# Patient Record
Sex: FEMALE | Race: WHITE | Employment: FULL TIME | URBAN - METROPOLITAN AREA
[De-identification: names, ages, dates, MRNs, and addresses within clinical notes are randomized per-mention and may not be internally consistent; named-entity substitution may affect disease eponyms.]

---

## 2019-03-19 ENCOUNTER — PRENATAL OFFICE VISIT (OUTPATIENT)
Dept: NURSING | Facility: CLINIC | Age: 39
End: 2019-03-19
Payer: COMMERCIAL

## 2019-03-19 DIAGNOSIS — O09.529 SUPERVISION OF HIGH-RISK PREGNANCY OF ELDERLY MULTIGRAVIDA: ICD-10-CM

## 2019-03-19 DIAGNOSIS — Z23 NEED FOR PROPHYLACTIC VACCINATION AND INOCULATION AGAINST INFLUENZA: Primary | ICD-10-CM

## 2019-03-19 LAB
ABO + RH BLD: NORMAL
ABO + RH BLD: NORMAL
BLD GP AB SCN SERPL QL: NORMAL
BLOOD BANK CMNT PATIENT-IMP: NORMAL
ERYTHROCYTE [DISTWIDTH] IN BLOOD BY AUTOMATED COUNT: 12.6 % (ref 10–15)
HCT VFR BLD AUTO: 37.4 % (ref 35–47)
HGB BLD-MCNC: 13.2 G/DL (ref 11.7–15.7)
MCH RBC QN AUTO: 30.1 PG (ref 26.5–33)
MCHC RBC AUTO-ENTMCNC: 35.3 G/DL (ref 31.5–36.5)
MCV RBC AUTO: 85 FL (ref 78–100)
PLATELET # BLD AUTO: 178 10E9/L (ref 150–450)
RBC # BLD AUTO: 4.38 10E12/L (ref 3.8–5.2)
SPECIMEN EXP DATE BLD: NORMAL
WBC # BLD AUTO: 9.3 10E9/L (ref 4–11)

## 2019-03-19 PROCEDURE — 87389 HIV-1 AG W/HIV-1&-2 AB AG IA: CPT | Performed by: ADVANCED PRACTICE MIDWIFE

## 2019-03-19 PROCEDURE — 87591 N.GONORRHOEAE DNA AMP PROB: CPT | Performed by: ADVANCED PRACTICE MIDWIFE

## 2019-03-19 PROCEDURE — 87491 CHLMYD TRACH DNA AMP PROBE: CPT | Performed by: ADVANCED PRACTICE MIDWIFE

## 2019-03-19 PROCEDURE — 36415 COLL VENOUS BLD VENIPUNCTURE: CPT | Performed by: ADVANCED PRACTICE MIDWIFE

## 2019-03-19 PROCEDURE — 86762 RUBELLA ANTIBODY: CPT | Performed by: ADVANCED PRACTICE MIDWIFE

## 2019-03-19 PROCEDURE — 99207 ZZC NO CHARGE NURSE ONLY: CPT

## 2019-03-19 PROCEDURE — 0064U ANTB TP TOTAL&RPR IA QUAL: CPT | Performed by: ADVANCED PRACTICE MIDWIFE

## 2019-03-19 PROCEDURE — 86900 BLOOD TYPING SEROLOGIC ABO: CPT | Performed by: ADVANCED PRACTICE MIDWIFE

## 2019-03-19 PROCEDURE — 85027 COMPLETE CBC AUTOMATED: CPT | Performed by: ADVANCED PRACTICE MIDWIFE

## 2019-03-19 PROCEDURE — 82306 VITAMIN D 25 HYDROXY: CPT | Performed by: ADVANCED PRACTICE MIDWIFE

## 2019-03-19 PROCEDURE — 87340 HEPATITIS B SURFACE AG IA: CPT | Performed by: ADVANCED PRACTICE MIDWIFE

## 2019-03-19 PROCEDURE — 86901 BLOOD TYPING SEROLOGIC RH(D): CPT | Performed by: ADVANCED PRACTICE MIDWIFE

## 2019-03-19 PROCEDURE — 87086 URINE CULTURE/COLONY COUNT: CPT | Performed by: ADVANCED PRACTICE MIDWIFE

## 2019-03-19 PROCEDURE — 86850 RBC ANTIBODY SCREEN: CPT | Performed by: ADVANCED PRACTICE MIDWIFE

## 2019-03-19 ASSESSMENT — MIFFLIN-ST. JEOR: SCORE: 1267.15

## 2019-03-19 NOTE — PROGRESS NOTES
"Chief Complaint   Patient presents with     Prenatal Care     New Prenatal Nurse Visit   8w6d    Estimated Date of Delivery: Oct 23, 2019      Initial BP (!) (P) 86/44 (BP Location: Right arm, Cuff Size: Adult Regular)   Ht 1.689 m (5' 6.5\")   Wt 56.2 kg (124 lb)   LMP 2019 (Exact Date)   BMI 19.71 kg/m   Estimated body mass index is 19.71 kg/m  as calculated from the following:    Height as of this encounter: 1.689 m (5' 6.5\").    Weight as of this encounter: 56.2 kg (124 lb).  BP completed using cuff size: regular    Questioned patient about current smoking habits.  Pt. has never smoked.    Prenatal book and folder (containing standard educational hand-outs and brochures) given to patient. Information in folder reviewed. Questions answered.     Discussed and gave written information on options available for 1st and 2nd trimester screening, CVS, amniocentesis, AFP, and QUAD screen plus optimal time-frame for testing. Brochure given on optional screening available to determine Cystic Fibrosis carrier status. Pt instructed to check with insurance regarding coverage of these optional tests. Pt advised to call back if she desires testing or has any questions or concerns.    Patient was in [place] from 19-2/15/19.  States was tested for the Zyka virus and it was negative.   has not been tested.  They have been having protected intercourse since + pregnancy test.    Prenatal labs obtained. New prenatal visit scheduled on 3/25/19 with Alley Madrid.  Meli Dejesus RN               "

## 2019-03-20 VITALS — BODY MASS INDEX: 19.46 KG/M2 | HEIGHT: 67 IN | WEIGHT: 124 LBS | HEART RATE: 76 BPM

## 2019-03-20 LAB
BACTERIA SPEC CULT: NORMAL
C TRACH DNA SPEC QL NAA+PROBE: NEGATIVE
DEPRECATED CALCIDIOL+CALCIFEROL SERPL-MC: 36 UG/L (ref 20–75)
HBV SURFACE AG SERPL QL IA: NONREACTIVE
HIV 1+2 AB+HIV1 P24 AG SERPL QL IA: NONREACTIVE
N GONORRHOEA DNA SPEC QL NAA+PROBE: NEGATIVE
RUBV IGG SERPL IA-ACNC: 121 IU/ML
SPECIMEN SOURCE: NORMAL
T PALLIDUM AB SER QL: NONREACTIVE

## 2019-03-22 DIAGNOSIS — O09.529 SUPERVISION OF HIGH-RISK PREGNANCY OF ELDERLY MULTIGRAVIDA: ICD-10-CM

## 2019-03-25 ENCOUNTER — PRENATAL OFFICE VISIT (OUTPATIENT)
Dept: OBGYN | Facility: CLINIC | Age: 39
End: 2019-03-25
Payer: COMMERCIAL

## 2019-03-25 VITALS — SYSTOLIC BLOOD PRESSURE: 86 MMHG | BODY MASS INDEX: 20.03 KG/M2 | DIASTOLIC BLOOD PRESSURE: 50 MMHG | WEIGHT: 126 LBS

## 2019-03-25 DIAGNOSIS — Z34.80 SUPERVISION OF OTHER NORMAL PREGNANCY, ANTEPARTUM: ICD-10-CM

## 2019-03-25 PROBLEM — Z87.59 HISTORY OF PLACENTA PREVIA: Status: ACTIVE | Noted: 2019-03-25

## 2019-03-25 PROBLEM — D03.52: Status: ACTIVE | Noted: 2018-01-16

## 2019-03-25 PROBLEM — N80.9 ENDOMETRIOSIS: Status: ACTIVE | Noted: 2017-07-25

## 2019-03-25 PROCEDURE — 99207 ZZC FIRST OB VISIT: CPT | Performed by: ADVANCED PRACTICE MIDWIFE

## 2019-03-25 NOTE — NURSING NOTE
"Chief Complaint   Patient presents with     Prenatal Care     9 weeks 5 days- placenta previa in previous pregnancy       Initial BP (!) 86/50 (BP Location: Right arm, Patient Position: Sitting, Cuff Size: Adult Regular)   Wt 57.2 kg (126 lb)   LMP 2019 (Exact Date)   BMI 20.03 kg/m   Estimated body mass index is 20.03 kg/m  as calculated from the following:    Height as of 3/19/19: 1.689 m (5' 6.5\").    Weight as of this encounter: 57.2 kg (126 lb).  BP completed using cuff size: regular    Questioned patient about current smoking habits.  Pt. has never smoked.          The following HM Due: NONE    9w5d  Hemal Crews CMA              "

## 2019-03-25 NOTE — PATIENT INSTRUCTIONS
When to call   Discuss this with your nurse-midwife.  In general, you should call if you have leaking fluid, bleeding, decreased baby movement or if your contractions are about five minutes apart.  The midwife pager phone number is [phone]. Please call this number.  When you call, you will be prompted to enter the call back number you would like the on call provider to call back.  Please enter a 10 digit phone number. You will hear a few beeps after entering the phone number.  The on-call provider will call you back, perhaps from a blocked phone number. If you do not hear from the on call midwife within 20 minutes, please call our main clinic phone [phone].    The hospital     Your baby will be born at the Birth[facility], which is on the fourth floor of [facility].  Call ([phone]) to arrange a tour of the Birth[facility].     Packing your bag    Bring clothing for you and your baby, and anything that may make your birth/stay more comfortable.  It is a good idea to practice installing the car seat before you deliver.  Please do not bring valuables to the hospital.

## 2019-03-25 NOTE — PROGRESS NOTES
Philly Turner is a 38 year old  ,  who is not a previous CNM patient. She presents for a new OB Visit. This was a planned pregnancy.     FOB is Joce who is in good health.  FOB IS actively involved in relationship and this pregnancy. Patient and  had been trying for 1 year and did not conceive. Went on a trip to [facility] in Feb and then found out she was pregnant. Concerned for Zika virus, has questions about  getting tested and intercourse. Patient states she was tested at her previous office and was negative.     She has not had bleeding since her LMP.    She denies abdominal pain since her LMP.  She has had nausea.  has had vomiting.  Any personal or family history of blood clots? No  History of sickle cell anemia or trait? No         Patient's last menstrual period was 2019 (exact date)..  Estimated Date of Delivery: Data Unavailable Ultrasound consistent with LMP.    MENSTRUAL HISTORY    frequency: every 28-30 days  Last PAP:  2017  History of abnormal Pap?  No    Health maintenance updated:  yes        Current medications are:    Current Outpatient Medications:      Prenatal Vit-Fe Fumarate-FA (PRENATAL MULTIVITAMIN  PLUS IRON) 27-0.8 MG TABS, Take 1 tablet by mouth daily., Disp: , Rfl:      INFECTION HISTORY  HIV: No  Hepatitis B: No  Hepatitis C: No  Tuberculosis: No    Genital Herpes self: no  Herpes partner:  no  Chlamydia:  no  Gonorrhea:  no  HPV: No  BV:  No  Syphilis:  No  Chicken Pox:  Yes - had       OB HISTORY  Obstetric History       T2      L2     SAB0   TAB0   Ectopic0   Multiple0   Live Births2       # Outcome Date GA Lbr Uziel/2nd Weight Sex Delivery Anes PTL Lv   3 Current            2 Term 14 39w3d / 00:15 3.37 kg (7 lb 6.9 oz) F Vag-Spont Local N MARTHA      Apgar1:  8                Apgar5: 9   1 Term 12 39w1d 08:06 / 00:39 3.459 kg (7 lb 10 oz) M Vag-Spont Local N MARTHA      Name: GARTH TURNER      Apgar1:  8                 Apgar5: 9          History of GDM: No,  PTL : No,  History of HTN in pregnancy: No,  Thrombocytopenia: No,  Shoulder dystocia: No,  Vacuum Extraction: No  PPH: No   3rd of 4th degree laceration: No.   Other complications: No    PERSONAL HISTORY  Exercise Habits:  jogging 4-7 days per week.  Employment: Full time.  Her job involves light activity with little potential for toxic exposure.    Travel plans:  are intra [place] air travel.   Diet: follows a balanced nutrition diet. No gluten or lactose   Abuse concerns? No  Hgb A1c screen:  BMI > 30: No, 1st degree family DM: No, History of GDM: No, PCOS: No, High risk ethnicity: No    Social History     Socioeconomic History     Marital status:      Spouse name: Not on file     Number of children: 2     Years of education: Not on file     Highest education level: Bachelor's degree (e.g., BA, AB, BS)   Occupational History     Not on file   Social Needs     Financial resource strain: Not on file     Food insecurity:     Worry: Not on file     Inability: Not on file     Transportation needs:     Medical: Not on file     Non-medical: Not on file   Tobacco Use     Smoking status: Never Smoker     Smokeless tobacco: Never Used   Substance and Sexual Activity     Alcohol use: No     Drug use: No     Sexual activity: Yes     Partners: Male     Birth control/protection: None   Lifestyle     Physical activity:     Days per week: Not on file     Minutes per session: Not on file     Stress: Not on file   Relationships     Social connections:     Talks on phone: Not on file     Gets together: Not on file     Attends Rastafarian service: Not on file     Active member of club or organization: Not on file     Attends meetings of clubs or organizations: Not on file     Relationship status: Not on file     Intimate partner violence:     Fear of current or ex partner: Not on file     Emotionally abused: Not on file     Physically abused: Not on file     Forced sexual activity: Not on  file   Other Topics Concern     Not on file   Social History Narrative     Not on file         She  reports that  has never smoked. she has never used smokeless tobacco.    STD testing offered?  Accepted  Last PHQ-9 score on record = No flowsheet data found.  Last GAD7 score on record = No flowsheet data found.  Alcohol Score = Not since pos UPT   Referral/Meds needed? Yes, M for 20 wk US     PAST MEDICAL/SURGICAL HISTORY  Past Medical History:   Diagnosis Date     Heart disease      incorrect dx ofLong QT wave, defribrillator placed. Dx reversed in  no longer has defibrillator, but wires still in place     Past Surgical History:   Procedure Laterality Date     CL AFF SURGICAL PATHOLOGY      melanoma chest stage 0     defribrillator[       LAPAROSCOPY DIAGNOSTIC (GYN)      endometriosis       FAMILY HISTORY  Family History   Problem Relation Age of Onset     Family History Negative Mother      Family History Negative Father      ALS Maternal Grandmother      Leukemia Paternal Grandfather      ROS:  12 point review of systems negative other than symptoms noted below.    PHYSICAL EXAM  Vitals: LMP 2019 (Exact Date)   BMI= There is no height or weight on file to calculate BMI.     GENERAL:  38 year old pleasant pregnant female, alert, cooperative and well groomed.  NECK:  Thyroid without enlargement and nodules.  Lymph nodes not palpable.   LUNGS:  Clear to auscultation.  BREAST:  Deferred   HEART:  RRR without murmur.  ABDOMEN: Soft without masses or tenderness.  No scars noted..  GENITALIA: deferred     LOWER EXTREMITIES: No edema. No significant varicosities.    ASSESSMENT/PLAN:    consult for US for AMA patients: NA AMA as recommended by M >age 40   Genetic Testing reviewed and discussed, patient desires to decline. Handout provided      COUNSELING    Instructed on use of triage nurse line and contacting the on call CNM after hours in an emergency.     Symptoms of N&V and fatigue usually  start to resolve around 12-16 weeks     Reviewed CNM philosophy, call schedule for labor and delivery, and [facility] for delivery    1st OB handout given outlining appointment spacing and CNM information    Reviewed exercise and nutrition    Recommend to gain 20 pounds with her pregnancy.    Discussed OTC medications. OB med list given    Encouraged patient to arrange  if needed    Encouraged patient to take PNV's/DHA    Travel precautions discussed, no air travel after 36 weeks and Zika Virus discussed    Will call patient with lab results when available    F/U to be addressed next visit:  return to clinic 4 weeks, referrals discussed MFM for 20 wk US given possible Zika exposure. Reviewed CDC recommendation for using condom or abstaining during entire pregnancy.  desires to get tested  For Zika.     Will return to the clinic in 4 weeks for her next routine prenatal check.  Will call to be seen sooner if problems arise.        Alley Madrid, DNP, APRN, CNM

## 2019-04-23 ENCOUNTER — PRENATAL OFFICE VISIT (OUTPATIENT)
Dept: OBGYN | Facility: CLINIC | Age: 39
End: 2019-04-23
Payer: COMMERCIAL

## 2019-04-23 VITALS
HEIGHT: 67 IN | BODY MASS INDEX: 20.58 KG/M2 | SYSTOLIC BLOOD PRESSURE: 98 MMHG | WEIGHT: 131.1 LBS | DIASTOLIC BLOOD PRESSURE: 54 MMHG

## 2019-04-23 DIAGNOSIS — Z34.80 SUPERVISION OF OTHER NORMAL PREGNANCY, ANTEPARTUM: Primary | ICD-10-CM

## 2019-04-23 PROCEDURE — 99207 ZZC PRENATAL VISIT: CPT | Performed by: ADVANCED PRACTICE MIDWIFE

## 2019-04-23 ASSESSMENT — MIFFLIN-ST. JEOR: SCORE: 1307.3

## 2019-04-23 NOTE — LETTER
re: Philly Busch  [address] [place] [address]    To Whom it May Concern,  Philly Busch is a prenatal patient at our clinic currently 13 weeks 6 days. She has a normal healthy pregnancy and is able to receive massage. Please assure that she does not lay directly on abdomen. Please contact the number listed above if you have any questions. Thank you for your consideration.               Sincerely,        Alley Madrid, PHILLY, APRN, CNM, IBCLC

## 2019-04-23 NOTE — PROGRESS NOTES
"S:Feels well  Fetal movement possibly   Denies loss of fluid/vb/contractions/pelvic pain  Depression screening done  O:  BP 98/54   Ht 1.702 m (5' 7\")   Wt 59.5 kg (131 lb 1.6 oz)   LMP 01/16/2019 (Exact Date)   BMI 20.53 kg/m    Exam:  Constitutional: healthy, alert and no distress  Respiratory: Respirations even and unlabored  Gastrointestinal: Abdomen soft, non-tender. Fundus measures appropriately for gestational age. Fetal heart tones heard easily.  Psychiatric: mentation appears normal and affect normal/bright  A: (Z34.80) Supervision of other normal pregnancy, antepartum  (primary encounter diagnosis)  Comment: wnl  Plan: US OB > 14 Weeks        return to clinic 4 weeks       P: Declines genetic screening   Anatomy ultrasound next visit between 20-22 weeks  Return to clinic 4 weeks    Alley Madrid, DNP, APRN, CNM, IBCLC        "

## 2019-04-23 NOTE — NURSING NOTE
"Chief Complaint   Patient presents with     Prenatal Care     13W6D       Initial BP 98/54   Ht 1.702 m (5' 7\")   Wt 59.5 kg (131 lb 1.6 oz)   LMP 2019 (Exact Date)   BMI 20.53 kg/m   Estimated body mass index is 20.53 kg/m  as calculated from the following:    Height as of this encounter: 1.702 m (5' 7\").    Weight as of this encounter: 59.5 kg (131 lb 1.6 oz).  BP completed using cuff size: regular    Questioned patient about current smoking habits.  Pt. has never smoked.          The following HM Due: NONE    Yasmeen Soto MA           "

## 2019-05-21 ENCOUNTER — PRENATAL OFFICE VISIT (OUTPATIENT)
Dept: OBGYN | Facility: CLINIC | Age: 39
End: 2019-05-21
Payer: COMMERCIAL

## 2019-05-21 VITALS
DIASTOLIC BLOOD PRESSURE: 58 MMHG | HEIGHT: 67 IN | BODY MASS INDEX: 20.92 KG/M2 | WEIGHT: 133.3 LBS | SYSTOLIC BLOOD PRESSURE: 84 MMHG

## 2019-05-21 DIAGNOSIS — I95.9 HYPOTENSION DETERMINED BY EXAMINATION: ICD-10-CM

## 2019-05-21 DIAGNOSIS — Z34.80 SUPERVISION OF OTHER NORMAL PREGNANCY, ANTEPARTUM: Primary | ICD-10-CM

## 2019-05-21 DIAGNOSIS — R00.2 HEART PALPITATIONS: ICD-10-CM

## 2019-05-21 LAB
ERYTHROCYTE [DISTWIDTH] IN BLOOD BY AUTOMATED COUNT: 14.1 % (ref 10–15)
HCT VFR BLD AUTO: 34.7 % (ref 35–47)
HGB BLD-MCNC: 12.3 G/DL (ref 11.7–15.7)
MCH RBC QN AUTO: 31.1 PG (ref 26.5–33)
MCHC RBC AUTO-ENTMCNC: 35.4 G/DL (ref 31.5–36.5)
MCV RBC AUTO: 88 FL (ref 78–100)
PLATELET # BLD AUTO: 173 10E9/L (ref 150–450)
RBC # BLD AUTO: 3.96 10E12/L (ref 3.8–5.2)
WBC # BLD AUTO: 9.8 10E9/L (ref 4–11)

## 2019-05-21 PROCEDURE — 84450 TRANSFERASE (AST) (SGOT): CPT | Performed by: ADVANCED PRACTICE MIDWIFE

## 2019-05-21 PROCEDURE — 99207 ZZC PRENATAL VISIT: CPT | Performed by: ADVANCED PRACTICE MIDWIFE

## 2019-05-21 PROCEDURE — 84460 ALANINE AMINO (ALT) (SGPT): CPT | Performed by: ADVANCED PRACTICE MIDWIFE

## 2019-05-21 PROCEDURE — 85027 COMPLETE CBC AUTOMATED: CPT | Performed by: ADVANCED PRACTICE MIDWIFE

## 2019-05-21 PROCEDURE — 36415 COLL VENOUS BLD VENIPUNCTURE: CPT | Performed by: ADVANCED PRACTICE MIDWIFE

## 2019-05-21 ASSESSMENT — MIFFLIN-ST. JEOR: SCORE: 1309.33

## 2019-05-21 NOTE — PROGRESS NOTES
"S: Feels well, started having intermittent heart palpatations,  Has started feeling fetal movement.  Denies uterine cramping, vaginal bleeding or leaking of fluid. Denies dizziness, vision changes, bruises, food aversions or inadequate intake.   O: Vitals: LMP 01/16/2019 (Exact Date)  BP (!) 84/58   Ht 1.689 m (5' 6.5\")   Wt 60.5 kg (133 lb 4.8 oz)   LMP 01/16/2019 (Exact Date)   BMI 21.19 kg/m      BMI= There is no height or weight on file to calculate BMI.  Exam:  Constitutional: healthy, alert and no distress  Respiratory: respirations even and unlabored  Gastrointestinal: Abdomen soft, non-tender. Fundus measures appropriate for gestational age. Fetal heart tones hear without difficulty and within normal limits  : Deferred  Psychiatric: mentation appears normal and affect normal/bright  A: (Z34.80) Supervision of other normal pregnancy, antepartum  (primary encounter diagnosis)  Comment: WNL  Plan: CBC with platelets, AST, ALT, CARDIOLOGY EVAL         ADULT REFERRAL        -Eval for hypotension with heart palpitations, has had baseline BPs 80s/50s.  -Recheck platelets and hbg today, eval LFTs for underlying etiology (differential diagnosis  ITP, liver disease)     (R00.2) Heart palpitations  Comment: referral   Plan: CARDIOLOGY EVAL ADULT REFERRAL           (I95.9) Hypotension determined by examination  Plan: CARDIOLOGY EVAL ADULT REFERRAL        See notes     P: Discussed 20 week fetal screen. Has scheduled.   Encouraged patient to call with any questions or concerns.  return to clinic 4 weeks       Alley Madrid, DNP, APRN, CNM, IBCLC      "

## 2019-05-21 NOTE — NURSING NOTE
"Chief Complaint   Patient presents with     Prenatal Care     17w6d       Initial BP (!) 84/58   Ht 1.689 m (5' 6.5\")   Wt 60.5 kg (133 lb 4.8 oz)   LMP 2019 (Exact Date)   BMI 21.19 kg/m   Estimated body mass index is 21.19 kg/m  as calculated from the following:    Height as of this encounter: 1.689 m (5' 6.5\").    Weight as of this encounter: 60.5 kg (133 lb 4.8 oz).  BP completed using cuff size: regular    Questioned patient about current smoking habits.  Pt. has never smoked.          The following HM Due: NONE      Yasmeen oSto MA           "

## 2019-05-22 LAB
ALT SERPL W P-5'-P-CCNC: 21 U/L (ref 0–50)
AST SERPL W P-5'-P-CCNC: 18 U/L (ref 0–45)

## 2019-06-07 ENCOUNTER — ANCILLARY PROCEDURE (OUTPATIENT)
Dept: ULTRASOUND IMAGING | Facility: CLINIC | Age: 39
End: 2019-06-07
Attending: ADVANCED PRACTICE MIDWIFE
Payer: COMMERCIAL

## 2019-06-07 DIAGNOSIS — Z34.80 SUPERVISION OF OTHER NORMAL PREGNANCY, ANTEPARTUM: ICD-10-CM

## 2019-06-07 PROCEDURE — 76805 OB US >/= 14 WKS SNGL FETUS: CPT | Performed by: FAMILY MEDICINE

## 2019-06-12 ENCOUNTER — TRANSCRIBE ORDERS (OUTPATIENT)
Dept: MATERNAL FETAL MEDICINE | Facility: CLINIC | Age: 39
End: 2019-06-12

## 2019-06-12 ENCOUNTER — TELEPHONE (OUTPATIENT)
Dept: OBGYN | Facility: CLINIC | Age: 39
End: 2019-06-12

## 2019-06-12 DIAGNOSIS — O26.90 PREGNANCY RELATED CONDITION: Primary | ICD-10-CM

## 2019-06-12 DIAGNOSIS — O44.42 LOW-LYING PLACENTA IN SECOND TRIMESTER: Primary | ICD-10-CM

## 2019-06-12 DIAGNOSIS — Z34.80 SUPERVISION OF OTHER NORMAL PREGNANCY, ANTEPARTUM: ICD-10-CM

## 2019-06-12 NOTE — TELEPHONE ENCOUNTER
Attempted to call pt to review US results. LM to call clinic back to review. Will need MFM referral. Will wait to place until I review US with patient. Alley Madrid, DNP, APRN, CNM, IBCLC

## 2019-06-12 NOTE — TELEPHONE ENCOUNTER
Called patient and reviewed ultrasound. MFM order placed. Alley Madrid, PHILLY, APRN, CNM, IBCLC

## 2019-06-13 ENCOUNTER — AMBULATORY - HEALTHEAST (OUTPATIENT)
Dept: MATERNAL FETAL MEDICINE | Facility: HOSPITAL | Age: 39
End: 2019-06-13

## 2019-06-13 DIAGNOSIS — O26.90 PREGNANCY, ANTEPARTUM, COMPLICATIONS: ICD-10-CM

## 2019-06-14 ENCOUNTER — OFFICE VISIT - HEALTHEAST (OUTPATIENT)
Dept: MATERNAL FETAL MEDICINE | Facility: HOSPITAL | Age: 39
End: 2019-06-14

## 2019-06-14 ENCOUNTER — MEDICAL CORRESPONDENCE (OUTPATIENT)
Dept: HEALTH INFORMATION MANAGEMENT | Facility: CLINIC | Age: 39
End: 2019-06-14

## 2019-06-14 ENCOUNTER — RECORDS - HEALTHEAST (OUTPATIENT)
Dept: ULTRASOUND IMAGING | Facility: HOSPITAL | Age: 39
End: 2019-06-14

## 2019-06-14 ENCOUNTER — RECORDS - HEALTHEAST (OUTPATIENT)
Dept: ADMINISTRATIVE | Facility: OTHER | Age: 39
End: 2019-06-14

## 2019-06-14 ENCOUNTER — COMMUNICATION - HEALTHEAST (OUTPATIENT)
Dept: MATERNAL FETAL MEDICINE | Facility: HOSPITAL | Age: 39
End: 2019-06-14

## 2019-06-14 ENCOUNTER — AMBULATORY - HEALTHEAST (OUTPATIENT)
Dept: MATERNAL FETAL MEDICINE | Facility: HOSPITAL | Age: 39
End: 2019-06-14

## 2019-06-14 ENCOUNTER — TRANSCRIBE ORDERS (OUTPATIENT)
Dept: MATERNAL FETAL MEDICINE | Facility: CLINIC | Age: 39
End: 2019-06-14

## 2019-06-14 DIAGNOSIS — O44.02 PLACENTA PREVIA IN SECOND TRIMESTER: ICD-10-CM

## 2019-06-14 DIAGNOSIS — O26.90 PREGNANCY RELATED CONDITION, ANTEPARTUM: Primary | ICD-10-CM

## 2019-06-14 DIAGNOSIS — O26.90 PREGNANCY RELATED CONDITIONS, UNSPECIFIED, UNSPECIFIED TRIMESTER: ICD-10-CM

## 2019-06-18 ENCOUNTER — PRENATAL OFFICE VISIT (OUTPATIENT)
Dept: OBGYN | Facility: CLINIC | Age: 39
End: 2019-06-18
Payer: COMMERCIAL

## 2019-06-18 VITALS
WEIGHT: 136.1 LBS | BODY MASS INDEX: 21.36 KG/M2 | SYSTOLIC BLOOD PRESSURE: 94 MMHG | HEIGHT: 67 IN | DIASTOLIC BLOOD PRESSURE: 60 MMHG

## 2019-06-18 DIAGNOSIS — O44.42 LOW-LYING PLACENTA IN SECOND TRIMESTER: ICD-10-CM

## 2019-06-18 DIAGNOSIS — Z34.80 SUPERVISION OF OTHER NORMAL PREGNANCY, ANTEPARTUM: Primary | ICD-10-CM

## 2019-06-18 PROCEDURE — 99207 ZZC PRENATAL VISIT: CPT | Performed by: ADVANCED PRACTICE MIDWIFE

## 2019-06-18 ASSESSMENT — MIFFLIN-ST. JEOR: SCORE: 1329.98

## 2019-06-18 NOTE — PROGRESS NOTES
"S: Feels well,  Has started feeling fetal movement.  Denies uterine cramping, vaginal bleeding or leaking of fluid.  States heart palpitations have resolved. Still seeing cardiology tomorrow.   O: Vitals: LMP 01/16/2019 (Exact Date)   BP 94/60   Ht 1.702 m (5' 7\")   Wt 61.7 kg (136 lb 1.6 oz)   LMP 01/16/2019 (Exact Date)   BMI 21.32 kg/m      BMI= There is no height or weight on file to calculate BMI.  Exam:  Constitutional: healthy, alert and no distress  Respiratory: respirations even and unlabored  Gastrointestinal: Abdomen soft, non-tender. Fundus measures appropriate for gestational age. Fetal heart tones heard without difficulty and within normal limits  : Deferred  Psychiatric: mentation appears normal and affect normal/bright  A:   (Z34.80) Supervision of other normal pregnancy, antepartum  (primary encounter diagnosis)  Comment: wnl  Plan: return to clinic 4 weeks routine prenatal visit     (O44.42) Low-lying placenta in second trimester  Plan: Pelvic rest, discussed. Instructed to call if experience any bleeding. States she had MFM appointment , noted placental lakes. Unable to see report in EHR. Has follow up with MFM for marginal previa.      Encouraged patient to call with any questions or concerns.      Alley Madrid, DNP, APRN, CNM, IBCLC      "

## 2019-06-18 NOTE — NURSING NOTE
"Chief Complaint   Patient presents with     Prenatal Care     21w6d       Initial BP 94/60   Ht 1.702 m (5' 7\")   Wt 61.7 kg (136 lb 1.6 oz)   LMP 2019 (Exact Date)   BMI 21.32 kg/m   Estimated body mass index is 21.32 kg/m  as calculated from the following:    Height as of this encounter: 1.702 m (5' 7\").    Weight as of this encounter: 61.7 kg (136 lb 1.6 oz).  BP completed using cuff size: regular    Questioned patient about current smoking habits.  Pt. has never smoked.          The following HM Due: NONE      Yasmeen Soto MA           "

## 2019-06-19 ENCOUNTER — OFFICE VISIT (OUTPATIENT)
Dept: CARDIOLOGY | Facility: CLINIC | Age: 39
End: 2019-06-19
Payer: COMMERCIAL

## 2019-06-19 VITALS
WEIGHT: 137.9 LBS | BODY MASS INDEX: 21.64 KG/M2 | SYSTOLIC BLOOD PRESSURE: 96 MMHG | OXYGEN SATURATION: 99 % | HEIGHT: 67 IN | DIASTOLIC BLOOD PRESSURE: 54 MMHG | HEART RATE: 75 BPM

## 2019-06-19 DIAGNOSIS — I95.0 IDIOPATHIC HYPOTENSION: Primary | ICD-10-CM

## 2019-06-19 DIAGNOSIS — R00.2 PALPITATIONS: ICD-10-CM

## 2019-06-19 PROCEDURE — 99203 OFFICE O/P NEW LOW 30 MIN: CPT | Performed by: INTERNAL MEDICINE

## 2019-06-19 ASSESSMENT — MIFFLIN-ST. JEOR: SCORE: 1330.2

## 2019-06-19 NOTE — LETTER
6/19/2019    Nicole Capellan MD  [facility] [address]  [facility] [address]    RE: Philly Busch       Dear Colleague,    I had the pleasure of seeing Philly Busch in the [facility].    HISTORY:    Philly Busch is a very pleasant, fit appearing 38-year-old female who is 22 weeks pregnant with her third child.  She was asked to see me because of low blood pressure and palpitations.    Philly reports that she is completely unaware of her low blood pressure.  Her blood pressure clinic was 84/58, but she denies any lightheadedness, dizziness, easy fatigability, syncope/near syncope, or other symptoms likely to be due to low blood pressure.  She is getting a little bit more short of breath with her pregnancy with activity such as walking upstairs, but she did this with each of her prior pregnancies and this does not seem any different to her.  She reports that on numerous occasions in the past, when she saw caregivers, they commented on how low her blood pressure was at rest, without pregnancy.  Unfortunately, she does not really recall any of the values of previous blood pressure readings, and they are not available to me.    The other problem for which Philly was referred to cardiology was palpitations.  She reports that she told her caregiver that she had noticed some fluttering in her chest, but since that visit the palpitations have quieted down considerably.  She was having them daily but now they have decreased in frequency and are now occurring about 2 times per week.  She notes that her fluttering seems like a very brief episode of rapid heart rate lasting only 2 or 3 seconds and not associated with lightheadedness, dizziness, near syncope, diaphoresis, or other symptoms.  The symptoms are mild and barely noticeable, and she does not find frightening or bothersome.    Philly has a cardiac history of being diagnosed with long QT syndrome after several syncopal  episodes in her teenage years.  She was seen at the [facility] and there was some test that was done that confirmed a long QT syndrome.  Her aunt also had this so there was a increased level of suspicion.  She had an ICD placed but eventually had a more thorough work-up at [facility] and it was determined that she did not have long QT syndrome.  Her ICD was removed but her wires were left in place.  She has had no syncopal episodes for well over a decade.    Physical exam today is completely normal.  There is a faint normal systolic murmur expected during pregnancy.      ASSESSMENT/PLAN:    1.  Hypotension.  This patient likely has some mild problems with autonomic regulation, explaining her previous syncopal episodes and her baseline low blood pressure.  With pregnancy, blood pressure drops until mid pregnancy and then gradually rises, so she currently at her trough.  Her blood pressure today is 96/54.  She is completely asymptomatic.  No further evaluation or therapy is necessary.  She has had a full cardiac evaluation in the past so we know that she does not have any undetected congenital heart abnormalities and her examination today is completely normal.  As long as she is not having problems with dizziness I would not be concerned with her low blood pressure.  I talked to her about using small frequent meals without large carbohydrate loads, and keeping herself well-hydrated  as well as increasing sodium intake as tools to try to raise her blood pressure area she states that she actually does all of those at this time anyway.  I have not made any new suggestions for her.  2.  Palpitations.  These are very mild and infrequent.  They are brief and have no associated symptoms.  I do not think we need to pursue this further since we know she has no structural abnormalities to her heart.  If these worsen we would do a Holter monitor to identify what arrhythmia she might have but I do not think this is  necessary in the infrequency and brief duration of her symptoms.    Thank you for inviting me to participate in your patient's care please do not hesitate to call if I can be of further assistance.  No orders of the defined types were placed in this encounter.    No orders of the defined types were placed in this encounter.    There are no discontinued medications.    10 year ASCVD risk: The ASCVD Risk score (Nataliejp MCBRIDE Jr., et al., 2013) failed to calculate for the following reasons:    The 2013 ASCVD risk score is only valid for ages 40 to 79    No diagnosis found.    CURRENT MEDICATIONS:  Current Outpatient Medications   Medication Sig Dispense Refill     Prenatal Vit-Fe Fumarate-FA (PRENATAL MULTIVITAMIN  PLUS IRON) 27-0.8 MG TABS Take 1 tablet by mouth daily.         ALLERGIES     Allergies   Allergen Reactions     Lactose      Intolerance     No Known Drug Allergies        PAST MEDICAL HISTORY:  Past Medical History:   Diagnosis Date     Heart disease      incorrect dx ofLong QT wave, defribrillator placed. Dx reversed in 07 no longer has defibrillator, but wires still in place       PAST SURGICAL HISTORY:  Past Surgical History:   Procedure Laterality Date     CL AFF SURGICAL PATHOLOGY      melanoma chest stage 0     defribrillator[       LAPAROSCOPY DIAGNOSTIC (GYN)  2011    endometriosis       FAMILY HISTORY:  Family History   Problem Relation Age of Onset     Family History Negative Mother      Family History Negative Father      ALS Maternal Grandmother      Leukemia Paternal Grandfather        SOCIAL HISTORY:  Social History     Socioeconomic History     Marital status:      Spouse name: None     Number of children: 2     Years of education: None     Highest education level: Bachelor's degree (e.g., BA, AB, BS)   Occupational History     None   Social Needs     Financial resource strain: None     Food insecurity:     Worry: None     Inability: None     Transportation needs:     Medical: None      "Non-medical: None   Tobacco Use     Smoking status: Never Smoker     Smokeless tobacco: Never Used   Substance and Sexual Activity     Alcohol use: No     Drug use: No     Sexual activity: Yes     Partners: Male     Birth control/protection: None   Lifestyle     Physical activity:     Days per week: None     Minutes per session: None     Stress: None   Relationships     Social connections:     Talks on phone: None     Gets together: None     Attends Hinduism service: None     Active member of club or organization: None     Attends meetings of clubs or organizations: None     Relationship status: None     Intimate partner violence:     Fear of current or ex partner: None     Emotionally abused: None     Physically abused: None     Forced sexual activity: None   Other Topics Concern     None   Social History Narrative     None       Review of Systems:  Skin:  Negative     Eyes:  Positive for contacts;glasses  ENT:  Negative    Respiratory:  Positive for shortness of breath  Cardiovascular:    Positive for;palpitations  Gastroenterology: Negative    Genitourinary:  Negative    Musculoskeletal:  Negative    Neurologic:       Psychiatric:  Negative    Heme/Lymph/Imm:  Negative    Endocrine:  Negative      Physical Exam:  Vitals: BP 96/54 (BP Location: Right arm, Patient Position: Chair, Cuff Size: Adult Regular)   Pulse 75   Ht 1.689 m (5' 6.5\")   Wt 62.6 kg (137 lb 14.4 oz)   LMP 01/16/2019 (Exact Date)   SpO2 99%   BMI 21.92 kg/m       Constitutional:  cooperative, alert and oriented, well developed, well nourished, in no acute distress   Fit in appearance    Skin:  warm and dry to the touch        Head:  normocephalic        Eyes:  no xanthalasma        ENT:  no pallor or cyanosis        Neck:  carotid pulses are full and equal bilaterally, JVP normal, no carotid bruit        Chest:  normal breath sounds, clear to auscultation, normal A-P diameter, normal symmetry, normal respiratory excursion, no use of " accessory muscles        Cardiac: regular rhythm, normal S1/S2, no S3 or S4, apical impulse not displaced, no murmurs, gallops or rubs       systolic murmur;grade 1          Abdomen:  abdomen soft;BS normoactive   gravid    Vascular: pulses full and equal                                      Extremities and Back:  no edema        Neurological:  no gross motor deficits          Recent Lab Results:  LIPID RESULTS:  No results found for: CHOL, HDL, LDL, TRIG, CHOLHDLRATIO    LIVER ENZYME RESULTS:  Lab Results   Component Value Date    AST 18 05/21/2019    ALT 21 05/21/2019       CBC RESULTS:  Lab Results   Component Value Date    WBC 9.8 05/21/2019    RBC 3.96 05/21/2019    HGB 12.3 05/21/2019    HCT 34.7 (L) 05/21/2019    MCV 88 05/21/2019    MCH 31.1 05/21/2019    MCHC 35.4 05/21/2019    RDW 14.1 05/21/2019     05/21/2019       BMP RESULTS:  Lab Results   Component Value Date     08/03/2010    POTASSIUM 3.7 08/03/2010    CHLORIDE 103 08/03/2010    CO2 28 08/03/2010    ANIONGAP 4 (L) 08/03/2010    GLC 88 08/03/2010    BUN 7 08/03/2010    CR 0.65 08/03/2010    GFRESTIMATED >90 08/03/2010    GFRESTBLACK >90 08/03/2010    PEYTON 9.1 08/03/2010        A1C RESULTS:  No results found for: A1C    INR RESULTS:  No results found for: INR      Suleman Jonas MD, [facility]    CC  JAIMIE Lou  [address] [place] [address]                    Thank you for allowing me to participate in the care of your patient.      Sincerely,     Suleman Jonas MD     [facility]    cc:   JAIMIE Lou CNM  [address]  [place] [phone]

## 2019-06-19 NOTE — PROGRESS NOTES
HISTORY:    Philly Busch is a very pleasant, fit appearing 38-year-old female who is 22 weeks pregnant with her third child.  She was asked to see me because of low blood pressure and palpitations.    Philly reports that she is completely unaware of her low blood pressure.  Her blood pressure clinic was 84/58, but she denies any lightheadedness, dizziness, easy fatigability, syncope/near syncope, or other symptoms likely to be due to low blood pressure.  She is getting a little bit more short of breath with her pregnancy with activity such as walking upstairs, but she did this with each of her prior pregnancies and this does not seem any different to her.  She reports that on numerous occasions in the past, when she saw caregivers, they commented on how low her blood pressure was at rest, without pregnancy.  Unfortunately, she does not really recall any of the values of previous blood pressure readings, and they are not available to me.    The other problem for which Philly was referred to cardiology was palpitations.  She reports that she told her caregiver that she had noticed some fluttering in her chest, but since that visit the palpitations have quieted down considerably.  She was having them daily but now they have decreased in frequency and are now occurring about 2 times per week.  She notes that her fluttering seems like a very brief episode of rapid heart rate lasting only 2 or 3 seconds and not associated with lightheadedness, dizziness, near syncope, diaphoresis, or other symptoms.  The symptoms are mild and barely noticeable, and she does not find frightening or bothersome.    Philly has a cardiac history of being diagnosed with long QT syndrome after several syncopal episodes in her teenage years.  She was seen at the [facility] and there was some test that was done that confirmed a long QT syndrome.  Her aunt also had this so there was a increased level of suspicion.  She had an ICD  placed but eventually had a more thorough work-up at [facility] and it was determined that she did not have long QT syndrome.  Her ICD was removed but her wires were left in place.  She has had no syncopal episodes for well over a decade.    Physical exam today is completely normal.  There is a faint normal systolic murmur expected during pregnancy.      ASSESSMENT/PLAN:    1.  Hypotension.  This patient likely has some mild problems with autonomic regulation, explaining her previous syncopal episodes and her baseline low blood pressure.  With pregnancy, blood pressure drops until mid pregnancy and then gradually rises, so she currently at her trough.  Her blood pressure today is 96/54.  She is completely asymptomatic.  No further evaluation or therapy is necessary.  She has had a full cardiac evaluation in the past so we know that she does not have any undetected congenital heart abnormalities and her examination today is completely normal.  As long as she is not having problems with dizziness I would not be concerned with her low blood pressure.  I talked to her about using small frequent meals without large carbohydrate loads, and keeping herself well-hydrated  as well as increasing sodium intake as tools to try to raise her blood pressure area she states that she actually does all of those at this time anyway.  I have not made any new suggestions for her.  2.  Palpitations.  These are very mild and infrequent.  They are brief and have no associated symptoms.  I do not think we need to pursue this further since we know she has no structural abnormalities to her heart.  If these worsen we would do a Holter monitor to identify what arrhythmia she might have but I do not think this is necessary in the infrequency and brief duration of her symptoms.    Thank you for inviting me to participate in your patient's care please do not hesitate to call if I can be of further assistance.  No orders of the defined types were  placed in this encounter.    No orders of the defined types were placed in this encounter.    There are no discontinued medications.    10 year ASCVD risk: The ASCVD Risk score ([place] RAE Jr., et al., 2013) failed to calculate for the following reasons:    The 2013 ASCVD risk score is only valid for ages 40 to 79    No diagnosis found.    CURRENT MEDICATIONS:  Current Outpatient Medications   Medication Sig Dispense Refill     Prenatal Vit-Fe Fumarate-FA (PRENATAL MULTIVITAMIN  PLUS IRON) 27-0.8 MG TABS Take 1 tablet by mouth daily.         ALLERGIES     Allergies   Allergen Reactions     Lactose      Intolerance     No Known Drug Allergies        PAST MEDICAL HISTORY:  Past Medical History:   Diagnosis Date     Heart disease      incorrect dx ofLong QT wave, defribrillator placed. Dx reversed in 07 no longer has defibrillator, but wires still in place       PAST SURGICAL HISTORY:  Past Surgical History:   Procedure Laterality Date     CL AFF SURGICAL PATHOLOGY      melanoma chest stage 0     defribrillator[       LAPAROSCOPY DIAGNOSTIC (GYN)  2011    endometriosis       FAMILY HISTORY:  Family History   Problem Relation Age of Onset     Family History Negative Mother      Family History Negative Father      ALS Maternal Grandmother      Leukemia Paternal Grandfather        SOCIAL HISTORY:  Social History     Socioeconomic History     Marital status:      Spouse name: None     Number of children: 2     Years of education: None     Highest education level: Bachelor's degree (e.g., BA, AB, BS)   Occupational History     None   Social Needs     Financial resource strain: None     Food insecurity:     Worry: None     Inability: None     Transportation needs:     Medical: None     Non-medical: None   Tobacco Use     Smoking status: Never Smoker     Smokeless tobacco: Never Used   Substance and Sexual Activity     Alcohol use: No     Drug use: No     Sexual activity: Yes     Partners: Male     Birth  "control/protection: None   Lifestyle     Physical activity:     Days per week: None     Minutes per session: None     Stress: None   Relationships     Social connections:     Talks on phone: None     Gets together: None     Attends Yazdanism service: None     Active member of club or organization: None     Attends meetings of clubs or organizations: None     Relationship status: None     Intimate partner violence:     Fear of current or ex partner: None     Emotionally abused: None     Physically abused: None     Forced sexual activity: None   Other Topics Concern     None   Social History Narrative     None       Review of Systems:  Skin:  Negative     Eyes:  Positive for contacts;glasses  ENT:  Negative    Respiratory:  Positive for shortness of breath  Cardiovascular:    Positive for;palpitations  Gastroenterology: Negative    Genitourinary:  Negative    Musculoskeletal:  Negative    Neurologic:       Psychiatric:  Negative    Heme/Lymph/Imm:  Negative    Endocrine:  Negative      Physical Exam:  Vitals: BP 96/54 (BP Location: Right arm, Patient Position: Chair, Cuff Size: Adult Regular)   Pulse 75   Ht 1.689 m (5' 6.5\")   Wt 62.6 kg (137 lb 14.4 oz)   LMP 01/16/2019 (Exact Date)   SpO2 99%   BMI 21.92 kg/m      Constitutional:  cooperative, alert and oriented, well developed, well nourished, in no acute distress   Fit in appearance    Skin:  warm and dry to the touch        Head:  normocephalic        Eyes:  no xanthalasma        ENT:  no pallor or cyanosis        Neck:  carotid pulses are full and equal bilaterally, JVP normal, no carotid bruit        Chest:  normal breath sounds, clear to auscultation, normal A-P diameter, normal symmetry, normal respiratory excursion, no use of accessory muscles        Cardiac: regular rhythm, normal S1/S2, no S3 or S4, apical impulse not displaced, no murmurs, gallops or rubs       systolic murmur;grade 1          Abdomen:  abdomen soft;BS normoactive   " gravid    Vascular: pulses full and equal                                      Extremities and Back:  no edema        Neurological:  no gross motor deficits          Recent Lab Results:  LIPID RESULTS:  No results found for: CHOL, HDL, LDL, TRIG, CHOLHDLRATIO    LIVER ENZYME RESULTS:  Lab Results   Component Value Date    AST 18 05/21/2019    ALT 21 05/21/2019       CBC RESULTS:  Lab Results   Component Value Date    WBC 9.8 05/21/2019    RBC 3.96 05/21/2019    HGB 12.3 05/21/2019    HCT 34.7 (L) 05/21/2019    MCV 88 05/21/2019    MCH 31.1 05/21/2019    MCHC 35.4 05/21/2019    RDW 14.1 05/21/2019     05/21/2019       BMP RESULTS:  Lab Results   Component Value Date     08/03/2010    POTASSIUM 3.7 08/03/2010    CHLORIDE 103 08/03/2010    CO2 28 08/03/2010    ANIONGAP 4 (L) 08/03/2010    GLC 88 08/03/2010    BUN 7 08/03/2010    CR 0.65 08/03/2010    GFRESTIMATED >90 08/03/2010    GFRESTBLACK >90 08/03/2010    PEYTON 9.1 08/03/2010        A1C RESULTS:  No results found for: A1C    INR RESULTS:  No results found for: INR      Suleman Jonas MD, FACC    CC  JAIMIE Lou [facility]  [address] [place] [address]

## 2019-06-19 NOTE — LETTER
6/19/2019    Nicole Capellan MD  [facility] [address]  [facility] [address]    RE: Philly Busch       Dear Colleague,    I had the pleasure of seeing Philly Busch in the [facility].    HISTORY:    Philly Busch is a very pleasant, fit appearing 38-year-old female who is 22 weeks pregnant with her third child.  She was asked to see me because of low blood pressure and palpitations.    Philly reports that she is completely unaware of her low blood pressure.  Her blood pressure clinic was 84/58, but she denies any lightheadedness, dizziness, easy fatigability, syncope/near syncope, or other symptoms likely to be due to low blood pressure.  She is getting a little bit more short of breath with her pregnancy with activity such as walking upstairs, but she did this with each of her prior pregnancies and this does not seem any different to her.  She reports that on numerous occasions in the past, when she saw caregivers, they commented on how low her blood pressure was at rest, without pregnancy.  Unfortunately, she does not really recall any of the values of previous blood pressure readings, and they are not available to me.    The other problem for which Philly was referred to cardiology was palpitations.  She reports that she told her caregiver that she had noticed some fluttering in her chest, but since that visit the palpitations have quieted down considerably.  She was having them daily but now they have decreased in frequency and are now occurring about 2 times per week.  She notes that her fluttering seems like a very brief episode of rapid heart rate lasting only 2 or 3 seconds and not associated with lightheadedness, dizziness, near syncope, diaphoresis, or other symptoms.  The symptoms are mild and barely noticeable, and she does not find frightening or bothersome.    Philly has a cardiac history of being diagnosed with long QT syndrome after several syncopal  episodes in her teenage years.  She was seen at the [facility] and there was some test that was done that confirmed a long QT syndrome.  Her aunt also had this so there was a increased level of suspicion.  She had an ICD placed but eventually had a more thorough work-up at [facility] and it was determined that she did not have long QT syndrome.  Her ICD was removed but her wires were left in place.  She has had no syncopal episodes for well over a decade.    Physical exam today is completely normal.  There is a faint normal systolic murmur expected during pregnancy.      ASSESSMENT/PLAN:    1.  Hypotension.  This patient likely has some mild problems with autonomic regulation, explaining her previous syncopal episodes and her baseline low blood pressure.  With pregnancy, blood pressure drops until mid pregnancy and then gradually rises, so she currently at her trough.  Her blood pressure today is 96/54.  She is completely asymptomatic.  No further evaluation or therapy is necessary.  She has had a full cardiac evaluation in the past so we know that she does not have any undetected congenital heart abnormalities and her examination today is completely normal.  As long as she is not having problems with dizziness I would not be concerned with her low blood pressure.  I talked to her about using small frequent meals without large carbohydrate loads, and keeping herself well-hydrated  as well as increasing sodium intake as tools to try to raise her blood pressure area she states that she actually does all of those at this time anyway.  I have not made any new suggestions for her.  2.  Palpitations.  These are very mild and infrequent.  They are brief and have no associated symptoms.  I do not think we need to pursue this further since we know she has no structural abnormalities to her heart.  If these worsen we would do a Holter monitor to identify what arrhythmia she might have but I do not think this is  necessary in the infrequency and brief duration of her symptoms.    Thank you for inviting me to participate in your patient's care please do not hesitate to call if I can be of further assistance.  No orders of the defined types were placed in this encounter.    No orders of the defined types were placed in this encounter.    There are no discontinued medications.    10 year ASCVD risk: The ASCVD Risk score (Nataliejp MCBRIDE Jr., et al., 2013) failed to calculate for the following reasons:    The 2013 ASCVD risk score is only valid for ages 40 to 79    No diagnosis found.    CURRENT MEDICATIONS:  Current Outpatient Medications   Medication Sig Dispense Refill     Prenatal Vit-Fe Fumarate-FA (PRENATAL MULTIVITAMIN  PLUS IRON) 27-0.8 MG TABS Take 1 tablet by mouth daily.         ALLERGIES     Allergies   Allergen Reactions     Lactose      Intolerance     No Known Drug Allergies        PAST MEDICAL HISTORY:  Past Medical History:   Diagnosis Date     Heart disease      incorrect dx ofLong QT wave, defribrillator placed. Dx reversed in 07 no longer has defibrillator, but wires still in place       PAST SURGICAL HISTORY:  Past Surgical History:   Procedure Laterality Date     CL AFF SURGICAL PATHOLOGY      melanoma chest stage 0     defribrillator[       LAPAROSCOPY DIAGNOSTIC (GYN)  2011    endometriosis       FAMILY HISTORY:  Family History   Problem Relation Age of Onset     Family History Negative Mother      Family History Negative Father      ALS Maternal Grandmother      Leukemia Paternal Grandfather        SOCIAL HISTORY:  Social History     Socioeconomic History     Marital status:      Spouse name: None     Number of children: 2     Years of education: None     Highest education level: Bachelor's degree (e.g., BA, AB, BS)   Occupational History     None   Social Needs     Financial resource strain: None     Food insecurity:     Worry: None     Inability: None     Transportation needs:     Medical: None      "Non-medical: None   Tobacco Use     Smoking status: Never Smoker     Smokeless tobacco: Never Used   Substance and Sexual Activity     Alcohol use: No     Drug use: No     Sexual activity: Yes     Partners: Male     Birth control/protection: None   Lifestyle     Physical activity:     Days per week: None     Minutes per session: None     Stress: None   Relationships     Social connections:     Talks on phone: None     Gets together: None     Attends Islam service: None     Active member of club or organization: None     Attends meetings of clubs or organizations: None     Relationship status: None     Intimate partner violence:     Fear of current or ex partner: None     Emotionally abused: None     Physically abused: None     Forced sexual activity: None   Other Topics Concern     None   Social History Narrative     None       Review of Systems:  Skin:  Negative     Eyes:  Positive for contacts;glasses  ENT:  Negative    Respiratory:  Positive for shortness of breath  Cardiovascular:    Positive for;palpitations  Gastroenterology: Negative    Genitourinary:  Negative    Musculoskeletal:  Negative    Neurologic:       Psychiatric:  Negative    Heme/Lymph/Imm:  Negative    Endocrine:  Negative      Physical Exam:  Vitals: BP 96/54 (BP Location: Right arm, Patient Position: Chair, Cuff Size: Adult Regular)   Pulse 75   Ht 1.689 m (5' 6.5\")   Wt 62.6 kg (137 lb 14.4 oz)   LMP 01/16/2019 (Exact Date)   SpO2 99%   BMI 21.92 kg/m       Constitutional:  cooperative, alert and oriented, well developed, well nourished, in no acute distress   Fit in appearance    Skin:  warm and dry to the touch        Head:  normocephalic        Eyes:  no xanthalasma        ENT:  no pallor or cyanosis        Neck:  carotid pulses are full and equal bilaterally, JVP normal, no carotid bruit        Chest:  normal breath sounds, clear to auscultation, normal A-P diameter, normal symmetry, normal respiratory excursion, no use of " accessory muscles        Cardiac: regular rhythm, normal S1/S2, no S3 or S4, apical impulse not displaced, no murmurs, gallops or rubs       systolic murmur;grade 1          Abdomen:  abdomen soft;BS normoactive   gravid    Vascular: pulses full and equal                                      Extremities and Back:  no edema        Neurological:  no gross motor deficits          Recent Lab Results:  LIPID RESULTS:  No results found for: CHOL, HDL, LDL, TRIG, CHOLHDLRATIO    LIVER ENZYME RESULTS:  Lab Results   Component Value Date    AST 18 05/21/2019    ALT 21 05/21/2019       CBC RESULTS:  Lab Results   Component Value Date    WBC 9.8 05/21/2019    RBC 3.96 05/21/2019    HGB 12.3 05/21/2019    HCT 34.7 (L) 05/21/2019    MCV 88 05/21/2019    MCH 31.1 05/21/2019    MCHC 35.4 05/21/2019    RDW 14.1 05/21/2019     05/21/2019       BMP RESULTS:  Lab Results   Component Value Date     08/03/2010    POTASSIUM 3.7 08/03/2010    CHLORIDE 103 08/03/2010    CO2 28 08/03/2010    ANIONGAP 4 (L) 08/03/2010    GLC 88 08/03/2010    BUN 7 08/03/2010    CR 0.65 08/03/2010    GFRESTIMATED >90 08/03/2010    GFRESTBLACK >90 08/03/2010    PEYTON 9.1 08/03/2010        A1C RESULTS:  No results found for: A1C    INR RESULTS:  No results found for: INR        Thank you for allowing me to participate in the care of your patient.    Sincerely,     Suleman Jonas MD     [facility] [place]    cc:   JAIMIE Lou [facility]  [address] [place] [address]

## 2019-07-23 ENCOUNTER — PRENATAL OFFICE VISIT (OUTPATIENT)
Dept: OBGYN | Facility: CLINIC | Age: 39
End: 2019-07-23
Payer: COMMERCIAL

## 2019-07-23 VITALS
HEIGHT: 67 IN | BODY MASS INDEX: 22.15 KG/M2 | SYSTOLIC BLOOD PRESSURE: 96 MMHG | DIASTOLIC BLOOD PRESSURE: 54 MMHG | WEIGHT: 141.1 LBS

## 2019-07-23 DIAGNOSIS — O44.42 LOW-LYING PLACENTA IN SECOND TRIMESTER: ICD-10-CM

## 2019-07-23 DIAGNOSIS — Z34.80 SUPERVISION OF OTHER NORMAL PREGNANCY, ANTEPARTUM: Primary | ICD-10-CM

## 2019-07-23 DIAGNOSIS — Z34.80 SUPERVISION OF OTHER NORMAL PREGNANCY, ANTEPARTUM: ICD-10-CM

## 2019-07-23 LAB
ERYTHROCYTE [DISTWIDTH] IN BLOOD BY AUTOMATED COUNT: 14 % (ref 10–15)
HCT VFR BLD AUTO: 32.1 % (ref 35–47)
HGB BLD-MCNC: 11.3 G/DL (ref 11.7–15.7)
MCH RBC QN AUTO: 32.1 PG (ref 26.5–33)
MCHC RBC AUTO-ENTMCNC: 35.2 G/DL (ref 31.5–36.5)
MCV RBC AUTO: 91 FL (ref 78–100)
PLATELET # BLD AUTO: 158 10E9/L (ref 150–450)
RBC # BLD AUTO: 3.52 10E12/L (ref 3.8–5.2)
WBC # BLD AUTO: 9.6 10E9/L (ref 4–11)

## 2019-07-23 PROCEDURE — 86780 TREPONEMA PALLIDUM: CPT | Performed by: ADVANCED PRACTICE MIDWIFE

## 2019-07-23 PROCEDURE — 99207 ZZC PRENATAL VISIT: CPT | Performed by: ADVANCED PRACTICE MIDWIFE

## 2019-07-23 PROCEDURE — 85027 COMPLETE CBC AUTOMATED: CPT | Performed by: ADVANCED PRACTICE MIDWIFE

## 2019-07-23 PROCEDURE — 36415 COLL VENOUS BLD VENIPUNCTURE: CPT | Performed by: ADVANCED PRACTICE MIDWIFE

## 2019-07-23 PROCEDURE — 82950 GLUCOSE TEST: CPT | Performed by: ADVANCED PRACTICE MIDWIFE

## 2019-07-23 ASSESSMENT — MIFFLIN-ST. JEOR: SCORE: 1344.72

## 2019-07-23 NOTE — PATIENT INSTRUCTIONS
When to call   Discuss this with your nurse-midwife.  In general, you should call if you have leaking fluid, bleeding, decreased baby movement or if your contractions are about five minutes apart.  The midwife pager phone number is [phone]. Please call this number.  When you call, you will be prompted to enter the call back number you would like the on call provider to call back.  Please enter a 10 digit phone number. You will hear a few beeps after entering the phone number.  The on-call provider will call you back, perhaps from a blocked phone number. If you do not hear from the on call midwife within 20 minutes, please call our main clinic phone [phone].    The hospital     Your baby will be born at the BirthPlace, which is on the fourth floor of [facility].  Call [phone]) to arrange a tour of the BirthPlace.     Packing your bag    Bring clothing for you and your baby, and anything that may make your birth/stay more comfortable.  It is a good idea to practice installing the car seat before you deliver.  Please do not bring valuables to the hospital.

## 2019-07-23 NOTE — NURSING NOTE
"Chief Complaint   Patient presents with     Prenatal Care       Initial BP 96/54   Ht 1.689 m (5' 6.5\")   Wt 64 kg (141 lb 1.6 oz)   LMP 2019 (Exact Date)   BMI 22.43 kg/m   Estimated body mass index is 22.43 kg/m  as calculated from the following:    Height as of this encounter: 1.689 m (5' 6.5\").    Weight as of this encounter: 64 kg (141 lb 1.6 oz).  BP completed using cuff size: regular    Questioned patient about current smoking habits.  Pt. has never smoked.          The following HM Due: NONE    Yasmeen Soto MA        "

## 2019-07-23 NOTE — PROGRESS NOTES
"S: Feels well,  Baby active.  Denies  vaginal bleeding or leaking of fluid. Occ BH contractions.   O: Vitals: LMP 01/16/2019 (Exact Date)  BP 96/54   Ht 1.689 m (5' 6.5\")   Wt 64 kg (141 lb 1.6 oz)   LMP 01/16/2019 (Exact Date)   BMI 22.43 kg/m      BMI= There is no height or weight on file to calculate BMI.  Exam:  Constitutional: healthy, alert and no distress  Respiratory: respirations even and unlabored  Gastrointestinal: Abdomen soft, non-tender. Fundus measures appropriate for gestational age. Fetal heart tones hear without difficulty and within normal limits  : Deferred  Psychiatric: mentation appears normal and affect normal/bright  A: (Z34.80) Supervision of other normal pregnancy, antepartum  (primary encounter diagnosis)  Comment: wnl  Plan: Treponema Abs w Reflex to RPR and Titer, CBC         with platelets, Glucose tolerance gest screen 1        hour        Ordered     (O44.42) Low-lying placenta in second trimester  Comment: reviewed plan of care   Plan: [facility] notes from [facility]   \"1) Coleman intrauterine pregnancy at 21 & 2/7 weeks gestational age.  2) None of the anomalies commonly detected by ultrasound were evident in the detailed fetal anatomic survey as described above.  3) Growth parameters and estimated fetal weight were consistent with established dates.  4) The amniotic fluid volume appeared normal.  5) Normal fetal activity for gestational age.  6) On transabdominal imaging the cervix appears long and closed.  7) There is a complete posterior placenta previa.  8) Several placental lakes are noted with at least one under the umbilical cord insertion into the placenta.\"    P: Discussed GCT/repeat RPR for next visit, handout provided, reminded of longer appointment.  Tdap next visit; reviewed CDC recommendations and partner/family vaccination recommended as well.  Need for Rhogam? No, to be done next visit   Plans to breastfeed.   Encouraged patient to call with any questions or " concerns.  Continue pelvic rest, discussed pending next US next Friday may need to AMANDA to MD care due to high risk pregnancy. Patient has not had any bleeding, reviewed warning signs reviewed and reviewed when to present or call. Patient verbalized understanding.     Plan for Growth at 28 and 34 weeks per MFM recommendation   Return to clinic 4 weeks    Alley Madrid, PHILLY, APRN, CNM, IBCLC

## 2019-07-24 DIAGNOSIS — O99.810 GLUCOSE INTOLERANCE OF PREGNANCY: ICD-10-CM

## 2019-07-24 DIAGNOSIS — Z34.80 SUPERVISION OF OTHER NORMAL PREGNANCY, ANTEPARTUM: Primary | ICD-10-CM

## 2019-07-24 LAB — GLUCOSE 1H P 50 G GLC PO SERPL-MCNC: 185 MG/DL (ref 60–129)

## 2019-07-25 LAB — T PALLIDUM AB SER QL: NONREACTIVE

## 2019-08-02 ENCOUNTER — OFFICE VISIT (OUTPATIENT)
Dept: MATERNAL FETAL MEDICINE | Facility: CLINIC | Age: 39
End: 2019-08-02
Attending: OBSTETRICS & GYNECOLOGY
Payer: COMMERCIAL

## 2019-08-02 ENCOUNTER — HOSPITAL ENCOUNTER (OUTPATIENT)
Dept: ULTRASOUND IMAGING | Facility: CLINIC | Age: 39
Discharge: HOME | End: 2019-08-02
Attending: OBSTETRICS & GYNECOLOGY | Admitting: OBSTETRICS & GYNECOLOGY
Payer: COMMERCIAL

## 2019-08-02 DIAGNOSIS — O44.40 LOW-LYING PLACENTA: Primary | ICD-10-CM

## 2019-08-02 DIAGNOSIS — O26.90 PREGNANCY RELATED CONDITION, ANTEPARTUM: ICD-10-CM

## 2019-08-02 PROCEDURE — 76816 OB US FOLLOW-UP PER FETUS: CPT

## 2019-08-02 NOTE — PROGRESS NOTES
"Please see \"Imaging\" tab under \"Chart Review\" for details of today's visit.    Jackelyn Avila    "

## 2019-08-05 DIAGNOSIS — O99.810 GLUCOSE INTOLERANCE OF PREGNANCY: ICD-10-CM

## 2019-08-05 DIAGNOSIS — Z34.80 SUPERVISION OF OTHER NORMAL PREGNANCY, ANTEPARTUM: ICD-10-CM

## 2019-08-05 PROCEDURE — 82952 GTT-ADDED SAMPLES: CPT | Performed by: ADVANCED PRACTICE MIDWIFE

## 2019-08-05 PROCEDURE — 82951 GLUCOSE TOLERANCE TEST (GTT): CPT | Performed by: ADVANCED PRACTICE MIDWIFE

## 2019-08-05 PROCEDURE — 36415 COLL VENOUS BLD VENIPUNCTURE: CPT | Performed by: ADVANCED PRACTICE MIDWIFE

## 2019-08-06 ENCOUNTER — PRENATAL OFFICE VISIT (OUTPATIENT)
Dept: OBGYN | Facility: CLINIC | Age: 39
End: 2019-08-06
Payer: COMMERCIAL

## 2019-08-06 VITALS
HEIGHT: 67 IN | DIASTOLIC BLOOD PRESSURE: 62 MMHG | WEIGHT: 143.6 LBS | SYSTOLIC BLOOD PRESSURE: 100 MMHG | BODY MASS INDEX: 22.54 KG/M2

## 2019-08-06 DIAGNOSIS — O44.43 LOW-LYING PLACENTA IN THIRD TRIMESTER: ICD-10-CM

## 2019-08-06 DIAGNOSIS — Z34.83 ENCOUNTER FOR SUPERVISION OF OTHER NORMAL PREGNANCY, THIRD TRIMESTER: Primary | ICD-10-CM

## 2019-08-06 LAB
GLUCOSE 1H P 100 G GLC PO SERPL-MCNC: 164 MG/DL (ref 60–179)
GLUCOSE 2H P 100 G GLC PO SERPL-MCNC: 139 MG/DL (ref 60–154)
GLUCOSE 3H P 100 G GLC PO SERPL-MCNC: 110 MG/DL (ref 60–139)
GLUCOSE P FAST SERPL-MCNC: 83 MG/DL (ref 60–94)

## 2019-08-06 PROCEDURE — 99207 ZZC PRENATAL VISIT: CPT | Performed by: ADVANCED PRACTICE MIDWIFE

## 2019-08-06 ASSESSMENT — MIFFLIN-ST. JEOR: SCORE: 1356.06

## 2019-08-06 NOTE — PROGRESS NOTES
"S: Feels well,  Baby active.  Denies uterine cramping, vaginal bleeding or leaking of fluid.    O: Vitals: LMP 01/16/2019 (Exact Date)   /62   Ht 1.689 m (5' 6.5\")   Wt 65.1 kg (143 lb 9.6 oz)   LMP 01/16/2019 (Exact Date)   BMI 22.83 kg/m      BMI= There is no height or weight on file to calculate BMI.  Exam:  Constitutional: healthy, alert and no distress  Respiratory: respirations even and unlabored  Gastrointestinal: Abdomen soft, non-tender. Fundus measures appropriate for gestational age. Fetal heart tones hear without difficulty and within normal limits  : Deferred  Psychiatric: mentation appears normal and affect normal/bright  A: (Z34.83) Encounter for supervision of other normal pregnancy, third trimester  (primary encounter diagnosis)  Comment: wnl  Plan: return to clinic 2 weeks  Passed 3 hour     (O44.43) Low-lying placenta in third trimester  Comment: continue to monitor  Plan: return to clinic MFM 6 week       P:   Need for Rhogam? No.   Plans to breastfeed.   Has never had epidural, does not want an epidural.   Discussed patient options for postpartum contraception. Patient is planning possible vasectomy  Discussed MFM US, low lying placenta, has f/u in 6 weeks. Continue pelvic rest, call or present immediately if any bleeding, patient denies bleeding this pregnancy   Encouraged patient to call with any questions or concerns.  Return to clinic 2 weeks    Alley Madrid, PHILLY, APRN, CNM, IBCLC                                "

## 2019-08-06 NOTE — NURSING NOTE
"No chief complaint on file.      Initial /62   Ht 1.689 m (5' 6.5\")   Wt 65.1 kg (143 lb 9.6 oz)   LMP 2019 (Exact Date)   BMI 22.83 kg/m   Estimated body mass index is 22.83 kg/m  as calculated from the following:    Height as of this encounter: 1.689 m (5' 6.5\").    Weight as of this encounter: 65.1 kg (143 lb 9.6 oz).  BP completed using cuff size: regular    Questioned patient about current smoking habits.  Pt. has never smoked.          The following HM Due: NONE      Yasmeen Soto MA    "

## 2019-08-20 ENCOUNTER — PRENATAL OFFICE VISIT (OUTPATIENT)
Dept: OBGYN | Facility: CLINIC | Age: 39
End: 2019-08-20
Payer: COMMERCIAL

## 2019-08-20 VITALS
BODY MASS INDEX: 22.91 KG/M2 | WEIGHT: 146 LBS | SYSTOLIC BLOOD PRESSURE: 100 MMHG | DIASTOLIC BLOOD PRESSURE: 64 MMHG | HEIGHT: 67 IN

## 2019-08-20 DIAGNOSIS — Z34.83 ENCOUNTER FOR SUPERVISION OF OTHER NORMAL PREGNANCY, THIRD TRIMESTER: Primary | ICD-10-CM

## 2019-08-20 DIAGNOSIS — O44.42 LOW-LYING PLACENTA IN SECOND TRIMESTER: ICD-10-CM

## 2019-08-20 PROCEDURE — 99207 ZZC PRENATAL VISIT: CPT | Performed by: ADVANCED PRACTICE MIDWIFE

## 2019-08-20 ASSESSMENT — MIFFLIN-ST. JEOR: SCORE: 1366.94

## 2019-08-20 NOTE — PROGRESS NOTES
"S: Feels well,  Baby active.  Denies uterine cramping, vaginal bleeding or leaking of fluid. Denies any bleeding.   O: Vitals: LMP 01/16/2019 (Exact Date)     /64   Ht 1.689 m (5' 6.5\")   Wt 66.2 kg (146 lb)   LMP 01/16/2019 (Exact Date)   BMI 23.21 kg/m      BMI= There is no height or weight on file to calculate BMI.  Exam:  Constitutional: healthy, alert and no distress  Respiratory: respirations even and unlabored  Gastrointestinal: Abdomen soft, non-tender. Fundus measures appropriate for gestational age. Fetal heart tones heard without difficulty and within normal limits  : Deferred  Psychiatric: mentation appears normal and affect normal/bright    A: (Z34.83) Encounter for supervision of other normal pregnancy, third trimester  (primary encounter diagnosis)  Comment: wnl  Plan: return to clinic 2 weeks     P: Has f/u with MFM for low lying placenta   Encouraged patient to call with any questions or concerns.  Return to clinic 2 weeks    Alley Madrid, DNP, APRN, CNM, IBCLC                  "

## 2019-08-20 NOTE — NURSING NOTE
"Chief Complaint   Patient presents with     Prenatal Care       Initial /64   Ht 1.689 m (5' 6.5\")   Wt 66.2 kg (146 lb)   LMP 2019 (Exact Date)   BMI 23.21 kg/m   Estimated body mass index is 23.21 kg/m  as calculated from the following:    Height as of this encounter: 1.689 m (5' 6.5\").    Weight as of this encounter: 66.2 kg (146 lb).  BP completed using cuff size: regular    Questioned patient about current smoking habits.  Pt. has never smoked.          The following HM Due: NONE      Yasmeen Soto MA           "

## 2019-09-04 ENCOUNTER — PRENATAL OFFICE VISIT (OUTPATIENT)
Dept: OBGYN | Facility: CLINIC | Age: 39
End: 2019-09-04
Payer: COMMERCIAL

## 2019-09-04 VITALS — SYSTOLIC BLOOD PRESSURE: 106 MMHG | WEIGHT: 146.3 LBS | BODY MASS INDEX: 23.26 KG/M2 | DIASTOLIC BLOOD PRESSURE: 64 MMHG

## 2019-09-04 DIAGNOSIS — Z34.83 ENCOUNTER FOR SUPERVISION OF OTHER NORMAL PREGNANCY, THIRD TRIMESTER: Primary | ICD-10-CM

## 2019-09-04 DIAGNOSIS — O44.43 LOW-LYING PLACENTA IN THIRD TRIMESTER: ICD-10-CM

## 2019-09-04 PROCEDURE — 99207 ZZC PRENATAL VISIT: CPT | Performed by: ADVANCED PRACTICE MIDWIFE

## 2019-09-04 NOTE — NURSING NOTE
"Chief Complaint   Patient presents with     Prenatal Care   33w0d  No concerns    initial /64   Wt 66.4 kg (146 lb 4.8 oz)   LMP 01/16/2019 (Exact Date)   BMI 23.26 kg/m   Estimated body mass index is 23.26 kg/m  as calculated from the following:    Height as of 8/20/19: 1.689 m (5' 6.5\").    Weight as of this encounter: 66.4 kg (146 lb 4.8 oz).  BP completed using cuff size regular.  Felipa Albert CMA    "

## 2019-09-04 NOTE — PROGRESS NOTES
S: Feels well,  Baby active.  Denies uterine cramping, vaginal bleeding or leaking of fluid  O: Vitals: LMP 01/16/2019 (Exact Date)  /64   Wt 66.4 kg (146 lb 4.8 oz)   LMP 01/16/2019 (Exact Date)   BMI 23.26 kg/m      BMI= There is no height or weight on file to calculate BMI.  Exam:  Constitutional: healthy, alert and no distress  Respiratory: respirations even and unlabored  Gastrointestinal: Abdomen soft, non-tender. Fundus measures small for gestational age. Fetal heart tones 140s hear without difficulty and within normal limits.  transverse per leopold's maneuver.   : Deferred  Psychiatric: mentation appears normal and affect normal/bright  A: (Z34.83) Encounter for supervision of other normal pregnancy, third trimester  (primary encounter diagnosis)  Comment: measures small for gestational age has MFM f/u for low lying placenta   Plan: return to clinic 2 weeks, has growth and f/u US next week. Eval growth as well due to measure s<d    (O44.43) Low-lying placenta in third trimester    P: Discussed GBS screen for next visit.  Encouraged patient to call with any questions or concerns.  Return to clinic 2 weeks    Alley Madrid, PHILLY, APRN, CNM, IBCLC

## 2019-09-13 ENCOUNTER — HOSPITAL ENCOUNTER (OUTPATIENT)
Dept: ULTRASOUND IMAGING | Facility: CLINIC | Age: 39
Discharge: HOME | End: 2019-09-13
Attending: OBSTETRICS & GYNECOLOGY | Admitting: OBSTETRICS & GYNECOLOGY
Payer: COMMERCIAL

## 2019-09-13 ENCOUNTER — OFFICE VISIT (OUTPATIENT)
Dept: MATERNAL FETAL MEDICINE | Facility: CLINIC | Age: 39
End: 2019-09-13
Attending: OBSTETRICS & GYNECOLOGY
Payer: COMMERCIAL

## 2019-09-13 DIAGNOSIS — O44.40 LOW-LYING PLACENTA: Primary | ICD-10-CM

## 2019-09-13 DIAGNOSIS — O44.40 LOW-LYING PLACENTA: ICD-10-CM

## 2019-09-13 PROCEDURE — 76817 TRANSVAGINAL US OBSTETRIC: CPT | Performed by: OBSTETRICS & GYNECOLOGY

## 2019-09-13 PROCEDURE — 76816 OB US FOLLOW-UP PER FETUS: CPT

## 2019-09-13 NOTE — PROGRESS NOTES
Please refer to ultrasound report under 'Imaging' Studies of 'Chart Review' tabs.    Satinder Valladares M.D.

## 2019-09-17 ENCOUNTER — PRENATAL OFFICE VISIT (OUTPATIENT)
Dept: OBGYN | Facility: CLINIC | Age: 39
End: 2019-09-17
Payer: COMMERCIAL

## 2019-09-17 VITALS — WEIGHT: 150 LBS | BODY MASS INDEX: 23.85 KG/M2

## 2019-09-17 DIAGNOSIS — Z34.83 ENCOUNTER FOR SUPERVISION OF OTHER NORMAL PREGNANCY, THIRD TRIMESTER: Primary | ICD-10-CM

## 2019-09-17 DIAGNOSIS — Z23 NEED FOR TDAP VACCINATION: ICD-10-CM

## 2019-09-17 PROCEDURE — 90471 IMMUNIZATION ADMIN: CPT | Performed by: ADVANCED PRACTICE MIDWIFE

## 2019-09-17 PROCEDURE — 99207 ZZC PRENATAL VISIT: CPT | Performed by: ADVANCED PRACTICE MIDWIFE

## 2019-09-17 PROCEDURE — 90715 TDAP VACCINE 7 YRS/> IM: CPT | Performed by: ADVANCED PRACTICE MIDWIFE

## 2019-09-17 RX ORDER — BREAST PUMP
1 EACH MISCELLANEOUS PRN
Qty: 1 EACH | Refills: 0 | Status: SHIPPED | OUTPATIENT
Start: 2019-09-17

## 2019-09-17 NOTE — NURSING NOTE
"Chief Complaint   Patient presents with     Prenatal Care       Initial Wt 68 kg (150 lb)   LMP 2019 (Exact Date)   Breastfeeding? No   BMI 23.85 kg/m   Estimated body mass index is 23.85 kg/m  as calculated from the following:    Height as of 19: 1.689 m (5' 6.5\").    Weight as of this encounter: 68 kg (150 lb).  BP completed using cuff size: regular    Questioned patient about current smoking habits.  Pt. has never smoked.          34w6d  + FM daily  + zaida hernandes contractions  - bleeding or leaking of fluid  - headache   - edema  Radha Minor LPN               "

## 2019-09-17 NOTE — PROGRESS NOTES
S: Feels well,  Baby active.  Denies uterine cramping, vaginal bleeding or leaking of fluid. Few contractions.   O: Vitals: LMP 01/16/2019 (Exact Date)  Wt 68 kg (150 lb)   LMP 01/16/2019 (Exact Date)   Breastfeeding? No   BMI 23.85 kg/m      BMI= There is no height or weight on file to calculate BMI.  Exam:  Constitutional: healthy, alert and no distress  Respiratory: respirations even and unlabored  Gastrointestinal: Abdomen soft, non-tender. Fundus measures appropriate for gestational age. Fetal heart tones 140 heard without difficulty and within normal limits.  Cephalic per leopold's maneuver.   : Deferred  Psychiatric: mentation appears normal and affect normal/bright  A:     ICD-10-CM    1. Encounter for supervision of other normal pregnancy, third trimester Z34.83      P: Discussed GBS screen for next visit.   Encouraged patient to call with any questions or concerns.  Breast pump prescription given  Low lying placenta resolved per MFM, no further follow up needed   Return to clinic 2 weeks    Alley Madrid, DNP, APRN, CNM, IBCLC

## 2019-10-01 ENCOUNTER — PRENATAL OFFICE VISIT (OUTPATIENT)
Dept: OBGYN | Facility: CLINIC | Age: 39
End: 2019-10-01
Payer: COMMERCIAL

## 2019-10-01 VITALS
HEIGHT: 67 IN | BODY MASS INDEX: 23.69 KG/M2 | DIASTOLIC BLOOD PRESSURE: 66 MMHG | SYSTOLIC BLOOD PRESSURE: 108 MMHG | WEIGHT: 150.9 LBS

## 2019-10-01 DIAGNOSIS — Z34.83 ENCOUNTER FOR SUPERVISION OF OTHER NORMAL PREGNANCY, THIRD TRIMESTER: Primary | ICD-10-CM

## 2019-10-01 PROCEDURE — 87653 STREP B DNA AMP PROBE: CPT | Performed by: ADVANCED PRACTICE MIDWIFE

## 2019-10-01 PROCEDURE — 99207 ZZC PRENATAL VISIT: CPT | Performed by: ADVANCED PRACTICE MIDWIFE

## 2019-10-01 ASSESSMENT — MIFFLIN-ST. JEOR: SCORE: 1389.17

## 2019-10-01 NOTE — NURSING NOTE
"Chief Complaint   Patient presents with     Prenatal Care       Initial /66   Ht 1.689 m (5' 6.5\")   Wt 68.4 kg (150 lb 14.4 oz)   LMP 2019 (Exact Date)   BMI 23.99 kg/m   Estimated body mass index is 23.99 kg/m  as calculated from the following:    Height as of this encounter: 1.689 m (5' 6.5\").    Weight as of this encounter: 68.4 kg (150 lb 14.4 oz).  BP completed using cuff size: regular    Questioned patient about current smoking habits.  Pt. has never smoked.          The following HM Due: NONE      Yasmeen Soto MA             "

## 2019-10-01 NOTE — PROGRESS NOTES
"S: Feels ready,  Baby active.  Denies uterine cramping, vaginal bleeding or leaking of fluid. No headache, increase in edema, no epigastric pain.   O: Vitals: LMP 01/16/2019 (Exact Date)   /66   Ht 1.689 m (5' 6.5\")   Wt 68.4 kg (150 lb 14.4 oz)   LMP 01/16/2019 (Exact Date)   BMI 23.99 kg/m      BMI= There is no height or weight on file to calculate BMI.  Exam:  Constitutional: healthy, alert and no distress  Respiratory: respirations even and unlabored  Gastrointestinal: Abdomen soft, non-tender. Fundus measures appropriate for gestational age. Fetal heart tones 135  heard without difficulty and within normal limits. Cephalic per leopold's maneuver   : Deferred  Psychiatric: mentation appears normal and affect normal/bright  A:     ICD-10-CM    1. Encounter for supervision of other normal pregnancy, third trimester Z34.83 Group B strep PCR     P:   Labor signs and symptoms discussed, aware of numbers to call  Risk/ benefits of pain medication options reviewed? YES  Birth plan reviewed? Yes  Discussed warning signs of PIH/preeclampsia and patient will monitor.  Breastfeeding? YES  Transportation plan for labor? YES   Discussed patient options for postpartum contraception. Patient is planning vasectomy  Circumcision risk/benefits reviewed. Circumcision? NOT REVIEWED  Pediatrician? Has     GBS screen completed. Encouraged patient to call with any questions or concerns.  Return to clinic 1 weeks    Alley Madrid, PHILLY, APRN, CNM, IBCLC              "

## 2019-10-03 LAB
GP B STREP DNA SPEC QL NAA+PROBE: NEGATIVE
SPECIMEN SOURCE: NORMAL

## 2019-10-09 ENCOUNTER — PRENATAL OFFICE VISIT (OUTPATIENT)
Dept: OBGYN | Facility: CLINIC | Age: 39
End: 2019-10-09
Payer: COMMERCIAL

## 2019-10-09 VITALS — BODY MASS INDEX: 24.17 KG/M2 | WEIGHT: 152 LBS | DIASTOLIC BLOOD PRESSURE: 62 MMHG | SYSTOLIC BLOOD PRESSURE: 104 MMHG

## 2019-10-09 DIAGNOSIS — Z34.83 ENCOUNTER FOR SUPERVISION OF OTHER NORMAL PREGNANCY, THIRD TRIMESTER: Primary | ICD-10-CM

## 2019-10-09 DIAGNOSIS — Z23 NEED FOR PROPHYLACTIC VACCINATION AND INOCULATION AGAINST INFLUENZA: ICD-10-CM

## 2019-10-09 PROCEDURE — 99207 ZZC PRENATAL VISIT: CPT | Performed by: ADVANCED PRACTICE MIDWIFE

## 2019-10-09 PROCEDURE — 90471 IMMUNIZATION ADMIN: CPT | Performed by: ADVANCED PRACTICE MIDWIFE

## 2019-10-09 PROCEDURE — 90686 IIV4 VACC NO PRSV 0.5 ML IM: CPT | Performed by: ADVANCED PRACTICE MIDWIFE

## 2019-10-09 NOTE — PROGRESS NOTES
S: Feels well,  Baby active.  Denies uterine cramping, vaginal bleeding or leaking of fluid. No headache, increase in edema, no epigastric pain. Sunday had regular contractions which self resolved, feeling more pelvic pressure.  O: Vitals: LMP 01/16/2019 (Exact Date)  /62   Wt 68.9 kg (152 lb)   LMP 01/16/2019 (Exact Date)   BMI 24.17 kg/m      BMI= There is no height or weight on file to calculate BMI.  Exam:  Constitutional: healthy, alert and no distress  Respiratory: respirations even and unlabored  Gastrointestinal: Abdomen soft, non-tender. Fundus measures appropriate for gestational age. Fetal heart tones 155 heard without difficulty and within normal limits Cephalic per leopold's maneuver.   : Deferred  Psychiatric: mentation appears normal and affect normal/bright  A:     ICD-10-CM    1. Encounter for supervision of other normal pregnancy, third trimester Z34.83      P: Labor signs and symptoms discussed, aware of numbers to call  Discussed warning signs of PIH/preeclampsia and patient will monitor.  GBS screen completed. Discussed plans for labor.   Reviewed counting fetal movement.  Encouraged patient to call with any questions or concerns.  Return to clinic 1 weeks    Alley Madrid, PHILLY, APRN, CNM, IBCLC

## 2019-10-09 NOTE — NURSING NOTE
"Chief Complaint   Patient presents with     Prenatal Care     38w       Initial /62   Wt 68.9 kg (152 lb)   LMP 2019 (Exact Date)   BMI 24.17 kg/m   Estimated body mass index is 24.17 kg/m  as calculated from the following:    Height as of 10/1/19: 1.689 m (5' 6.5\").    Weight as of this encounter: 68.9 kg (152 lb).  BP completed using cuff size: regular    Questioned patient about current smoking habits.  Pt. has never smoked.          The following HM Due: NONE  Yarely Cohn CMA         "

## 2019-10-15 ENCOUNTER — PRENATAL OFFICE VISIT (OUTPATIENT)
Dept: OBGYN | Facility: CLINIC | Age: 39
End: 2019-10-15
Payer: COMMERCIAL

## 2019-10-15 VITALS
DIASTOLIC BLOOD PRESSURE: 62 MMHG | WEIGHT: 151 LBS | HEIGHT: 67 IN | SYSTOLIC BLOOD PRESSURE: 102 MMHG | BODY MASS INDEX: 23.7 KG/M2

## 2019-10-15 DIAGNOSIS — Z34.83 ENCOUNTER FOR SUPERVISION OF OTHER NORMAL PREGNANCY, THIRD TRIMESTER: Primary | ICD-10-CM

## 2019-10-15 PROCEDURE — 99207 ZZC PRENATAL VISIT: CPT | Performed by: ADVANCED PRACTICE MIDWIFE

## 2019-10-15 ASSESSMENT — MIFFLIN-ST. JEOR: SCORE: 1389.62

## 2019-10-15 NOTE — NURSING NOTE
"Chief Complaint   Patient presents with     Prenatal Care     38w6d       Initial /66   Ht 1.689 m (5' 6.5\")   Wt 88.8 kg (195 lb 11.2 oz)   LMP 2019 (Exact Date)   BMI 31.11 kg/m   Estimated body mass index is 31.11 kg/m  as calculated from the following:    Height as of this encounter: 1.689 m (5' 6.5\").    Weight as of this encounter: 88.8 kg (195 lb 11.2 oz).  BP completed using cuff size: regular    Questioned patient about current smoking habits.  Pt. has never smoked.          The following HM Due: NONE      Yasmeen Soto MA          "

## 2019-10-15 NOTE — PROGRESS NOTES
"S: Feels well,  Baby active.  Denies uterine cramping, vaginal bleeding or leaking of fluid. No headache, increase in edema, no epigastric pain. Had consistent contractions last night, strong and was breathing through. Self resolved but desires cervical exam.   O: Vitals: LMP 01/16/2019 (Exact Date)   /62   Ht 1.689 m (5' 6.5\")   Wt 68.5 kg (151 lb)   LMP 01/16/2019 (Exact Date)   BMI 24.01 kg/m      BMI= There is no height or weight on file to calculate BMI.  Exam:  Constitutional: healthy, alert and no distress  Respiratory: respirations even and unlabored  Gastrointestinal: Abdomen soft, non-tender. Fundus measures appropriate for gestational age. Fetal heart tones 140s heard without difficulty and within normal limits Cephalic per leopold's maneuver.   : Deferred  Psychiatric: mentation appears normal and affect normal/bright  A:     ICD-10-CM    1. Encounter for supervision of other normal pregnancy, third trimester Z34.83      P: Labor signs and symptoms discussed, aware of numbers to call  Discussed warning signs of PIH/preeclampsia and patient will monitor.  GBS screen completed. Discussed plans for labor.   Reviewed counting fetal movement.  Encouraged patient to call with any questions or concerns.  Return to clinic 1 weeks    Alley Madrid, DNP, APRN, CNM, IBCLC        "

## 2019-10-15 NOTE — NURSING NOTE
"Chief Complaint   Patient presents with     Prenatal Care     38w6d       Initial /62   Ht 1.689 m (5' 6.5\")   Wt 68.5 kg (151 lb)   LMP 2019 (Exact Date)   BMI 24.01 kg/m   Estimated body mass index is 24.01 kg/m  as calculated from the following:    Height as of this encounter: 1.689 m (5' 6.5\").    Weight as of this encounter: 68.5 kg (151 lb).  BP completed using cuff size: regular    Questioned patient about current smoking habits.  Pt. has never smoked.          The following HM Due: NONE    Yasmeen Soto MA      "

## 2019-10-21 ENCOUNTER — TELEPHONE (OUTPATIENT)
Dept: OBGYN | Facility: CLINIC | Age: 39
End: 2019-10-21

## 2019-10-21 ENCOUNTER — HOSPITAL ENCOUNTER (INPATIENT)
Facility: CLINIC | Age: 39
LOS: 1 days | Discharge: HOME | End: 2019-10-21
Attending: ADVANCED PRACTICE MIDWIFE | Admitting: ADVANCED PRACTICE MIDWIFE
Payer: COMMERCIAL

## 2019-10-21 VITALS
HEART RATE: 82 BPM | RESPIRATION RATE: 16 BRPM | HEIGHT: 67 IN | WEIGHT: 150 LBS | TEMPERATURE: 97.6 F | SYSTOLIC BLOOD PRESSURE: 120 MMHG | DIASTOLIC BLOOD PRESSURE: 58 MMHG | BODY MASS INDEX: 23.54 KG/M2

## 2019-10-21 LAB
ABO + RH BLD: NORMAL
ABO + RH BLD: NORMAL
BASOPHILS # BLD AUTO: 0.1 10E9/L (ref 0–0.2)
BASOPHILS NFR BLD AUTO: 0.6 %
BLD GP AB SCN SERPL QL: NORMAL
BLOOD BANK CMNT PATIENT-IMP: NORMAL
DIFFERENTIAL METHOD BLD: ABNORMAL
EOSINOPHIL # BLD AUTO: 0.1 10E9/L (ref 0–0.7)
EOSINOPHIL NFR BLD AUTO: 0.6 %
ERYTHROCYTE [DISTWIDTH] IN BLOOD BY AUTOMATED COUNT: 13.4 % (ref 10–15)
HCT VFR BLD AUTO: 37.9 % (ref 35–47)
HGB BLD-MCNC: 12.8 G/DL (ref 11.7–15.7)
IMM GRANULOCYTES # BLD: 0.1 10E9/L (ref 0–0.4)
IMM GRANULOCYTES NFR BLD: 0.4 %
LYMPHOCYTES # BLD AUTO: 1.9 10E9/L (ref 0.8–5.3)
LYMPHOCYTES NFR BLD AUTO: 16.4 %
MCH RBC QN AUTO: 30.8 PG (ref 26.5–33)
MCHC RBC AUTO-ENTMCNC: 33.8 G/DL (ref 31.5–36.5)
MCV RBC AUTO: 91 FL (ref 78–100)
MONOCYTES # BLD AUTO: 0.7 10E9/L (ref 0–1.3)
MONOCYTES NFR BLD AUTO: 6.1 %
NEUTROPHILS # BLD AUTO: 8.8 10E9/L (ref 1.6–8.3)
NEUTROPHILS NFR BLD AUTO: 75.9 %
NRBC # BLD AUTO: 0 10*3/UL
NRBC BLD AUTO-RTO: 0 /100
PLATELET # BLD AUTO: 154 10E9/L (ref 150–450)
RBC # BLD AUTO: 4.16 10E12/L (ref 3.8–5.2)
SPECIMEN EXP DATE BLD: NORMAL
WBC # BLD AUTO: 11.6 10E9/L (ref 4–11)

## 2019-10-21 PROCEDURE — 25000132 ZZH RX MED GY IP 250 OP 250 PS 637: Performed by: ADVANCED PRACTICE MIDWIFE

## 2019-10-21 PROCEDURE — 36415 COLL VENOUS BLD VENIPUNCTURE: CPT | Performed by: ADVANCED PRACTICE MIDWIFE

## 2019-10-21 PROCEDURE — 86780 TREPONEMA PALLIDUM: CPT | Performed by: ADVANCED PRACTICE MIDWIFE

## 2019-10-21 PROCEDURE — 12000000 ZZH R&B MED SURG/OB

## 2019-10-21 PROCEDURE — 86901 BLOOD TYPING SEROLOGIC RH(D): CPT | Performed by: ADVANCED PRACTICE MIDWIFE

## 2019-10-21 PROCEDURE — G0463 HOSPITAL OUTPT CLINIC VISIT: HCPCS

## 2019-10-21 PROCEDURE — 59025 FETAL NON-STRESS TEST: CPT | Mod: 26 | Performed by: ADVANCED PRACTICE MIDWIFE

## 2019-10-21 PROCEDURE — 86850 RBC ANTIBODY SCREEN: CPT | Performed by: ADVANCED PRACTICE MIDWIFE

## 2019-10-21 PROCEDURE — 99235 HOSP IP/OBS SAME DATE MOD 70: CPT | Performed by: ADVANCED PRACTICE MIDWIFE

## 2019-10-21 PROCEDURE — 85025 COMPLETE CBC W/AUTO DIFF WBC: CPT | Performed by: ADVANCED PRACTICE MIDWIFE

## 2019-10-21 PROCEDURE — 86900 BLOOD TYPING SEROLOGIC ABO: CPT | Performed by: ADVANCED PRACTICE MIDWIFE

## 2019-10-21 RX ORDER — FENTANYL CITRATE 50 UG/ML
50-100 INJECTION, SOLUTION INTRAMUSCULAR; INTRAVENOUS
Status: DISCONTINUED | OUTPATIENT
Start: 2019-10-21 | End: 2019-10-22 | Stop reason: HOSPADM

## 2019-10-21 RX ORDER — NALOXONE HYDROCHLORIDE 0.4 MG/ML
.1-.4 INJECTION, SOLUTION INTRAMUSCULAR; INTRAVENOUS; SUBCUTANEOUS
Status: DISCONTINUED | OUTPATIENT
Start: 2019-10-21 | End: 2019-10-22 | Stop reason: HOSPADM

## 2019-10-21 RX ORDER — OXYTOCIN 10 [USP'U]/ML
10 INJECTION, SOLUTION INTRAMUSCULAR; INTRAVENOUS
Status: DISCONTINUED | OUTPATIENT
Start: 2019-10-21 | End: 2019-10-22 | Stop reason: HOSPADM

## 2019-10-21 RX ORDER — ACETAMINOPHEN 325 MG/1
650 TABLET ORAL EVERY 4 HOURS PRN
Status: DISCONTINUED | OUTPATIENT
Start: 2019-10-21 | End: 2019-10-22 | Stop reason: HOSPADM

## 2019-10-21 RX ORDER — OXYTOCIN/0.9 % SODIUM CHLORIDE 30/500 ML
100-340 PLASTIC BAG, INJECTION (ML) INTRAVENOUS CONTINUOUS PRN
Status: DISCONTINUED | OUTPATIENT
Start: 2019-10-21 | End: 2019-10-22 | Stop reason: HOSPADM

## 2019-10-21 RX ORDER — HYDROXYZINE HYDROCHLORIDE 50 MG/1
100 TABLET, FILM COATED ORAL ONCE
Status: COMPLETED | OUTPATIENT
Start: 2019-10-21 | End: 2019-10-21

## 2019-10-21 RX ORDER — OXYCODONE AND ACETAMINOPHEN 5; 325 MG/1; MG/1
1 TABLET ORAL
Status: DISCONTINUED | OUTPATIENT
Start: 2019-10-21 | End: 2019-10-22 | Stop reason: HOSPADM

## 2019-10-21 RX ORDER — IBUPROFEN 800 MG/1
800 TABLET, FILM COATED ORAL
Status: DISCONTINUED | OUTPATIENT
Start: 2019-10-21 | End: 2019-10-22 | Stop reason: HOSPADM

## 2019-10-21 RX ORDER — ONDANSETRON 2 MG/ML
4 INJECTION INTRAMUSCULAR; INTRAVENOUS EVERY 6 HOURS PRN
Status: DISCONTINUED | OUTPATIENT
Start: 2019-10-21 | End: 2019-10-22 | Stop reason: HOSPADM

## 2019-10-21 RX ORDER — SODIUM CHLORIDE, SODIUM LACTATE, POTASSIUM CHLORIDE, CALCIUM CHLORIDE 600; 310; 30; 20 MG/100ML; MG/100ML; MG/100ML; MG/100ML
INJECTION, SOLUTION INTRAVENOUS CONTINUOUS
Status: DISCONTINUED | OUTPATIENT
Start: 2019-10-21 | End: 2019-10-22 | Stop reason: HOSPADM

## 2019-10-21 RX ORDER — CARBOPROST TROMETHAMINE 250 UG/ML
250 INJECTION, SOLUTION INTRAMUSCULAR
Status: DISCONTINUED | OUTPATIENT
Start: 2019-10-21 | End: 2019-10-22 | Stop reason: HOSPADM

## 2019-10-21 RX ORDER — METHYLERGONOVINE MALEATE 0.2 MG/ML
200 INJECTION INTRAVENOUS
Status: DISCONTINUED | OUTPATIENT
Start: 2019-10-21 | End: 2019-10-22 | Stop reason: HOSPADM

## 2019-10-21 RX ADMIN — HYDROXYZINE HYDROCHLORIDE 100 MG: 50 TABLET ORAL at 22:36

## 2019-10-21 ASSESSMENT — MIFFLIN-ST. JEOR: SCORE: 1393.03

## 2019-10-21 NOTE — H&P
SIL[facility] Labor Admission History & Physical    Philly Busch is a 38 year old  with an IUP at 39w5d  ; ,   Partner/support Person: Joce  Language Barrier: English  Clinic: [facility]  Provider: Emilia    Philly Busch is admitted to the Birthplace at [facility] on 10/21/2019 at 4:49 PM       History of present inllness/Chief Complaint:  Patient reports contractions starting at 0700 this morning and have gotten progressively stronger and closer together. Initial pain scale of 4/10 upon arrival to the hospital, now stating 6/10, but still coping well. Patient has a history of precipitous labors once she is active and does not feel comfortable going home since her contractions are getting stronger.   Here with: likely early labor  Patient reports contractions are Regular           Baby active Yes  Membranes are intact.  Bloody show Yes   Any changes with medical history since last prenatal visit No  Declines syphilis screening.  No    Obstetrical history  Estimated Date of Delivery: Oct 23, 2019 determined by early US and LMP  Patient's last menstrual period was 2019 (exact date).   Dating U/S: 3/22/19    Fetal anatomic survey: Normal  Placenta: posterior and at least one placental lake noted near cord insertion.    PRENATAL COURSE  Prenatal care began at 8 wks gestation for a total of 13 prenatal visits.  Total wt gain 28; Body mass index is 23.49 kg/m .  Prenatal Blood Pressure: WNL  Prenatal course was complicated by low-lying placenta, now resolved and at least one placental lake noted on US  Tdap: given  Rhogam: n/a    Patient Active Problem List   Diagnosis     Endometriosis     Melanoma in situ of left breast (H)     History of placenta previa     Supervision of other normal pregnancy, antepartum     Low-lying placenta in second trimester       HISTORY  Allergies   Allergen Reactions     Lactose      Intolerance     No Known Drug Allergies      Past Medical History:    Diagnosis Date     Cancer (H)     stage 0 melanoma chest     Heart disease      incorrect dx ofLong QT wave, defribrillator placed. Dx reversed in 07 no longer has defibrillator, but wires still in place     Past Surgical History:   Procedure Laterality Date     CL AFF SURGICAL PATHOLOGY      melanoma chest stage 0     defribrillator[       LAPAROSCOPY DIAGNOSTIC (GYN)      endometriosis     Family History   Problem Relation Age of Onset     Family History Negative Mother      Family History Negative Father      ALS Maternal Grandmother      Leukemia Paternal Grandfather      Social History     Tobacco Use     Smoking status: Never Smoker     Smokeless tobacco: Never Used   Substance Use Topics     Alcohol use: No     OB History    Para Term  AB Living   3 2 2 0 0 2   SAB TAB Ectopic Multiple Live Births   0 0 0 0 2      # Outcome Date GA Lbr Uziel/2nd Weight Sex Delivery Anes PTL Lv   3 Current            2 Term 14 39w3d / 00:15 3.37 kg (7 lb 6.9 oz) F Vag-Spont Local N MARTHA      Apgar1: 8  Apgar5: 9   1 Term 12 39w1d 08:06 / 00:39 3.459 kg (7 lb 10 oz) M Vag-Spont Local N MARTHA      Name: GARTH TURNER      Apgar1: 8  Apgar5: 9       LABS:  Lab Results   Component Value Date    ABO O 2019    RH Pos 2019    AS Neg 2019    HGB 11.3 (L) 2019    HEPBANG Nonreactive 2019    CHPCRT Negative 2019    GCPCRT Negative 2019    TREPAB negative 09/15/2011    RUBELLAABIGG immune 09/15/2011    HIV negative 09/15/2011       GBS Status:   Lab Results   Component Value Date    GBS Negative 10/01/2019     Rubella: Immune    HIV: Non-Reactive   Platelets:  158  1hr GCT:  Fail. GTT: pass    ROS   Pt is alert and oriented  Pt denies significant constitutional symptoms including fever and/or malaise.    Pt denies significant respiratory, cardiovacular, GI, or muscular/skeletal complaints.    Neuro: Denies HA and visual changes  Muscoloskeletal: Denies except for  "discomforts r/t pregnancy     PHYSICAL EXAM:  /69   Pulse 83   Temp 97.8  F (36.6  C) (Oral)   Resp 16   Ht 1.702 m (5' 7\")   Wt 68 kg (150 lb)   LMP 2019 (Exact Date)   BMI 23.49 kg/m    General appearance:  healthy, alert and active   Heart: RRR  Lungs: CTA bilaterally, normal respiratory effort  Abdomen: gravid, single vertex fetus, non-tender, EFW 7-8 lbs.   Legs: reflexes 2+ bilaterally, no clonus, no edema     Contractions: Pt is dillon every 2-3 minutes, lasting 60-90 seconds and palpates moderate    Fetal heart tones: Baseline 135   Variability: moderate   Accelerations: present  Decelerations: absent    NST: reactive    Cervix: 3/ 50/ Mid/ soft/ -2, Vtx  Bloody show: yes   Membranes:  intact    ASSESSMENT:  38 year old  with anthony IUP 39w5d in early labor  NST reactive  GBS negative and membranes intact    PLAN:  Admit - see IP orders  Routine CNM care  Labs ordered: Hemoglobin and type and screen  Teaching done r/t comfort measures, pain management options, and labor processes.  Recommend upright postioning and ambulation to help facilitate labor process. Will plan to attempt positioning maneuvers including Rebozo, side-lying release, and inversions to help fetal engagement.      Lizette Crews CNM    "

## 2019-10-21 NOTE — PROVIDER NOTIFICATION
10/21/19 1645   Provider Notification   Provider Name/Title SILKE Bauer   Method of Notification At Bedside   Request Evaluate in Person   Notification Reason Status Update;SVE   Midwife at bedside to evaluate.  Pt is still dillon and getting more uncomfortable.. SVE is now 3/60-70/-2.  Pt is going to be admitted and orders entered.

## 2019-10-21 NOTE — TELEPHONE ENCOUNTER
39w5d  G 3 P 2.  Estimated Date of Delivery: Oct 23, 2019      Pt calls in and reports possible sx labor, they started at 7am.  Cxts:  3-6 min apart, lasting 1 min.  Happening for 1 hour(s).    ROM (if not intact what time):  none  Fetal movement last felt: today, nml  Position of baby at last OV:  cephalic  Dilated at last OV?: 2cm on 10/15/19.  GBS:  Neg  Blood type:  O pos  Hx of fast deliveries: Yes, delivered 30 min after arriving to hospital   Scheduled for C section?: no  Sent to L&D?:  Yes, due to 3rd delivery and cxts so close together    This message was routed to Lizette Crews CNM, on call provider.  L&D aware.    Collette CARVER R.N.  [facility]

## 2019-10-21 NOTE — PLAN OF CARE
Data: Patient presented to Birthplace: 10/21/2019  2:20 PM.  Reason for maternal/fetal assessment is uterine contractions. Patient reports that she has been feeling contractions since like 7am and have started to get closer together.  Pt is still able to talk through them and does not appear to uncomfortable.  Patient is a .  Prenatal record reviewed. Pregnancy has been uncomplicated..  Gestational Age 39w5d. VSS. Fetal movement present. Patient denies leaking of vaginal fluid/rupture of membranes, vaginal bleeding, abdominal pain, pelvic pressure, nausea, vomiting, headache, visual disturbances, epigastric or URQ pain, significant edema. Support person is present.   Action: Verbal consent for EFM. Triage assessment completed. Bill of rights reviewed.  Response: Patient verbalized agreement with plan. Will contact Dr Lizette Crews with update and further orders.

## 2019-10-21 NOTE — PROGRESS NOTES
"MATERNAL ASSESSMENT CENTER CNM TRIAGE NOTE    Philly Busch is a 38 year old  with and IUP at 39w5d who presents with complaint of contractions starting around 0700. The contractions have gotten progressively closer together and stronger since this morning, but she is coping well. Reports her last two deliveries were quick and she doesn't report intense pain until she is ready to push and have a baby.    Patient states baby is active.  Denies ROM   Denies vaginal bleeding  Present OB History at [facility] with the CNMs.     Problems this pregnancy:   1. Low-lying placenta resolved  2. One or more placental lakes noted under cord insertion    ROS:  Patient is alert and oriented      PHYSICAL EXAM:  /69   Pulse 83   Temp 97.8  F (36.6  C) (Oral)   Resp 16   Ht 1.702 m (5' 7\")   Wt 68 kg (150 lb)   LMP 2019 (Exact Date)   BMI 23.49 kg/m      FHT's 145 with moderate variability  Accelerations: present   Decelerations:  absent        Contractions: Pt is dillon every 2-3 minutes, lasting 60 seconds and palpates moderate     Abdomen: gravid,   Bloody show: no  Cervix: 2/ 50%/ Posterior/ soft/ -2  Membranes are intact       ASSESSMENT :   38 year old  with anthony IUP 39w5d for observation. Possible early labor.  NST  reactive  GBS negative and membranes intact      PLAN:  Will plan to observe patient in triage for 1-2 hours and assess for cervical change.   Recommend patient ambulate to help facilitate labor progress.  Inform staff of change in pain level.    Lizette Crews CNM        "

## 2019-10-22 NOTE — DISCHARGE INSTRUCTIONS
Discharge Instruction for Undelivered Patients      You were seen for: Labor Assessment  We Consulted:SILKE Bauer  You had (Test or Medicine):fetal monitoring, sve     Diet:   Drink 8 to 12 glasses of liquids (milk, juice, water) every day.  You may eat meals and snacks.     Activity:  Count fetal kicks everyday (see handout)  Call your doctor or nurse midwife if your baby is moving less than usual.     Call your provider if you notice:  Swelling in your face or increased swelling in your hands or legs.  Headaches that are not relieved by Tylenol (acetaminophen).  Changes in your vision (blurring: seeing spots or stars.)  Nausea (sick to your stomach) and vomiting (throwing up).   Weight gain of 5 pounds or more per week.  Heartburn that doesn't go away.  Signs of bladder infection: pain when you urinate (use the toilet), need to go more often and more urgently.  The bag of byrne (rupture of membranes) breaks, or you notice leaking in your underwear.  Bright red blood in your underwear.  Abdominal (lower belly) or stomach pain.  Second (plus) baby: Contractions (tightening) less than 10 minutes apart and getting stronger.  Increase or change in vaginal discharge (note the color and amount)      Follow-up:  Make an appointment to be seen on later this week or next if do not go into labor

## 2019-10-22 NOTE — PROVIDER NOTIFICATION
10/21/19 1930   Comments   Comments Pt off the monitors and was given till 2100 to be off them.

## 2019-10-22 NOTE — PROGRESS NOTES
"CNM PROGRESS NOTE    SUBJECTIVE:  Patient in room ambulating, sitting on birth ball, was able to eat a light dinner. She reports that contractions are getting stronger, but she is coping very well. Agreeable to position changes and stretched to help facilitate labor process    OBJECTIVE:  /69   Pulse 83   Temp 97.8  F (36.6  C) (Oral)   Resp 16   Ht 1.702 m (5' 7\")   Wt 68 kg (150 lb)   LMP 01/16/2019 (Exact Date)   BMI 23.49 kg/m      Fetal heart tones: Baseline 140   Variability: moderate  Accelerations: present  Decelerations: absent    Contractions: Pt is dillon every 2-4 minutes, lasting 60-80 seconds and palpates moderate    Cervix: Deferred  ROM: not ruptured    Pitocin- none  Antibiotics- none  Cervical ripening: N/A    ASSESSMENT:  IUP @ 39w5d early labor   GBS- negative     PLAN:   CNM in room to perform rebozo, inversions, and side-lying release.  Recommend ambulating after position changes and abdominal lift/tuck.  comfort measures prn   Will recheck cervix in 2 hours or sooner if needed.    Lizette Crews CNM      "

## 2019-10-22 NOTE — PROVIDER NOTIFICATION
10/21/19 2154   Provider Notification   Provider Name/Title SILKE Bauer   Method of Notification At Bedside   Request Evaluate in Person   Notification Reason Status Update;JUAN A Bauer CNM at bedside to recheck pt's cervix.  Pt's contractions are still there but have spaced and pt appears comfortable.  THUANROSE MARIE was the same 3/60/-2 and still posterior.  Lizette discussed at this time that she does not feel like pt is in active labor and would like pt to discharge to home and pt and  are ok with this plan.

## 2019-10-22 NOTE — PROGRESS NOTES
"CNM PROGRESS NOTE    SUBJECTIVE:  Patient in room, has been ambulating and using birth ball. Reports she is feeling like the contractions have gotten less intense and is now feeling sleepy. Agreeable to SVE.    OBJECTIVE:  /58   Pulse 82   Temp 97.6  F (36.4  C) (Oral)   Resp 16   Ht 1.702 m (5' 7\")   Wt 68 kg (150 lb)   LMP 01/16/2019 (Exact Date)   BMI 23.49 kg/m      Fetal heart tones: Baseline 135   Variability: moderate  Accelerations: present  Decelerations: absent    Contractions: Pt is dillon every 2-6 minutes, lasting 60 seconds and palpates mild    Cervix: 2.5/ 60/ -2, Vtx, bloody show after SVE  ROM: not ruptured    Pitocin- none  Antibiotics- none  Cervical ripening: N/A    ASSESSMENT:  IUP @ 39w5d possible early labor, but no cervical change  GBS- negative     PLAN:   Discussed physiology of early and prodromal labor in-depth with patient and .  Discussed options of continued watchful waiting in the hospital or discharge to home with vistaril.   Patient would like to go home and will keep on-call CNM posted with any changes.   Warning s/s reviewed. Patient agrees with plan and verbalizes understanding.  Follow up in clinic later this week for postdates discussion if still pregnant.       Lizette Crews CNM      "

## 2019-10-23 LAB — T PALLIDUM AB SER QL: NONREACTIVE

## 2019-10-24 ENCOUNTER — PRENATAL OFFICE VISIT (OUTPATIENT)
Dept: OBGYN | Facility: CLINIC | Age: 39
End: 2019-10-24
Payer: COMMERCIAL

## 2019-10-24 ENCOUNTER — ANCILLARY PROCEDURE (OUTPATIENT)
Dept: ULTRASOUND IMAGING | Facility: CLINIC | Age: 39
End: 2019-10-24
Attending: ADVANCED PRACTICE MIDWIFE
Payer: COMMERCIAL

## 2019-10-24 VITALS
WEIGHT: 155 LBS | SYSTOLIC BLOOD PRESSURE: 118 MMHG | BODY MASS INDEX: 24.33 KG/M2 | DIASTOLIC BLOOD PRESSURE: 68 MMHG | HEIGHT: 67 IN

## 2019-10-24 DIAGNOSIS — Z34.83 ENCOUNTER FOR SUPERVISION OF OTHER NORMAL PREGNANCY, THIRD TRIMESTER: Primary | ICD-10-CM

## 2019-10-24 DIAGNOSIS — Z34.83 ENCOUNTER FOR SUPERVISION OF OTHER NORMAL PREGNANCY, THIRD TRIMESTER: ICD-10-CM

## 2019-10-24 PROCEDURE — 99207 ZZC PRENATAL VISIT: CPT | Performed by: ADVANCED PRACTICE MIDWIFE

## 2019-10-24 PROCEDURE — 76819 FETAL BIOPHYS PROFIL W/O NST: CPT | Performed by: OBSTETRICS & GYNECOLOGY

## 2019-10-24 ASSESSMENT — MIFFLIN-ST. JEOR: SCORE: 1407.77

## 2019-10-24 NOTE — NURSING NOTE
"Chief Complaint   Patient presents with     Prenatal Care       Initial /68   Ht 1.689 m (5' 6.5\")   Wt 70.3 kg (155 lb)   LMP 2019 (Exact Date)   BMI 24.64 kg/m   Estimated body mass index is 24.64 kg/m  as calculated from the following:    Height as of this encounter: 1.689 m (5' 6.5\").    Weight as of this encounter: 70.3 kg (155 lb).  BP completed using cuff size: regular    Questioned patient about current smoking habits.  Pt. has never smoked.          The following HM Due: NONE    Yasmeen Soto MA        "

## 2019-10-24 NOTE — PROGRESS NOTES
"S: Feels ready,  Baby active.  Denies uterine cramping, vaginal bleeding or leaking of fluid. No headache, increase in edema, no epigastric pain.   O: Vitals: LMP 01/16/2019 (Exact Date)   /68   Ht 1.689 m (5' 6.5\")   Wt 70.3 kg (155 lb)   LMP 01/16/2019 (Exact Date)   BMI 24.64 kg/m      BMI= There is no height or weight on file to calculate BMI.  Exam:  Constitutional: healthy, alert and no distress  Respiratory: respirations even and unlabored  Gastrointestinal: Abdomen soft, non-tender. Fundus measures appropriate for gestational age. Fetal heart tones 150s heard without difficulty and within normal limits.  Cephalic per leopold's maneuver.   : Normal external genitalia without lesions  Psychiatric: mentation appears normal and affect normal/bright  A: (Z34.83) Encounter for supervision of other normal pregnancy, third trimester  (primary encounter diagnosis)  Comment: ordered   Plan: US Fetal Biophys Prof w/o Non Stress Test        -BPP ordered for this week, patient declines AMA IOL prefers to wait for spontaneous labor.     P: Labor signs and symptoms discussed, aware of numbers to call.  Discussed warning signs of PIH/preeclampsia and patient will monitor.  GBS screen completed. Discussed plans for labor.  Reviewed counting fetal movements.   Discussed postdates options and need for bi-weekly BPPs to start at 40 weeks. AMA declines IOL   Encouraged patient to call with any questions or concerns.  Return to clinic 1 weeks    Alley Madrid, PHILLY, APRN, CNM, IBCLC            "

## 2019-10-26 ENCOUNTER — TELEPHONE (OUTPATIENT)
Dept: OBGYN | Facility: CLINIC | Age: 39
End: 2019-10-26

## 2019-10-26 ENCOUNTER — HOSPITAL ENCOUNTER (INPATIENT)
Facility: CLINIC | Age: 39
LOS: 1 days | Discharge: HOME HEALTH | End: 2019-10-27
Attending: ADVANCED PRACTICE MIDWIFE | Admitting: ADVANCED PRACTICE MIDWIFE
Payer: COMMERCIAL

## 2019-10-26 PROCEDURE — 25000128 H RX IP 250 OP 636: Performed by: ADVANCED PRACTICE MIDWIFE

## 2019-10-26 PROCEDURE — 72200001 ZZH LABOR CARE VAGINAL DELIVERY SINGLE

## 2019-10-26 PROCEDURE — 40000083 ZZH STATISTIC IP LACTATION SERVICES 1-15 MIN

## 2019-10-26 PROCEDURE — 59400 OBSTETRICAL CARE: CPT | Performed by: ADVANCED PRACTICE MIDWIFE

## 2019-10-26 PROCEDURE — 25000132 ZZH RX MED GY IP 250 OP 250 PS 637: Performed by: ADVANCED PRACTICE MIDWIFE

## 2019-10-26 PROCEDURE — 12000000 ZZH R&B MED SURG/OB

## 2019-10-26 RX ORDER — LANOLIN 100 %
OINTMENT (GRAM) TOPICAL
Status: DISCONTINUED | OUTPATIENT
Start: 2019-10-26 | End: 2019-10-27 | Stop reason: HOSPADM

## 2019-10-26 RX ORDER — NALOXONE HYDROCHLORIDE 0.4 MG/ML
.1-.4 INJECTION, SOLUTION INTRAMUSCULAR; INTRAVENOUS; SUBCUTANEOUS
Status: DISCONTINUED | OUTPATIENT
Start: 2019-10-26 | End: 2019-10-27 | Stop reason: HOSPADM

## 2019-10-26 RX ORDER — NALOXONE HYDROCHLORIDE 0.4 MG/ML
.1-.4 INJECTION, SOLUTION INTRAMUSCULAR; INTRAVENOUS; SUBCUTANEOUS
Status: DISCONTINUED | OUTPATIENT
Start: 2019-10-26 | End: 2019-10-26

## 2019-10-26 RX ORDER — MISOPROSTOL 200 UG/1
400 TABLET ORAL
Status: DISCONTINUED | OUTPATIENT
Start: 2019-10-26 | End: 2019-10-27 | Stop reason: HOSPADM

## 2019-10-26 RX ORDER — AMOXICILLIN 250 MG
1 CAPSULE ORAL 2 TIMES DAILY
Status: DISCONTINUED | OUTPATIENT
Start: 2019-10-26 | End: 2019-10-27 | Stop reason: HOSPADM

## 2019-10-26 RX ORDER — FENTANYL CITRATE 50 UG/ML
50-100 INJECTION, SOLUTION INTRAMUSCULAR; INTRAVENOUS
Status: DISCONTINUED | OUTPATIENT
Start: 2019-10-26 | End: 2019-10-26

## 2019-10-26 RX ORDER — ACETAMINOPHEN 325 MG/1
650 TABLET ORAL EVERY 4 HOURS PRN
Status: DISCONTINUED | OUTPATIENT
Start: 2019-10-26 | End: 2019-10-26

## 2019-10-26 RX ORDER — HYDROCORTISONE 2.5 %
CREAM (GRAM) TOPICAL 3 TIMES DAILY PRN
Status: DISCONTINUED | OUTPATIENT
Start: 2019-10-26 | End: 2019-10-27 | Stop reason: HOSPADM

## 2019-10-26 RX ORDER — BISACODYL 10 MG
10 SUPPOSITORY, RECTAL RECTAL DAILY PRN
Status: DISCONTINUED | OUTPATIENT
Start: 2019-10-28 | End: 2019-10-27 | Stop reason: HOSPADM

## 2019-10-26 RX ORDER — OXYTOCIN 10 [USP'U]/ML
10 INJECTION, SOLUTION INTRAMUSCULAR; INTRAVENOUS
Status: COMPLETED | OUTPATIENT
Start: 2019-10-26 | End: 2019-10-26

## 2019-10-26 RX ORDER — IBUPROFEN 800 MG/1
800 TABLET, FILM COATED ORAL
Status: COMPLETED | OUTPATIENT
Start: 2019-10-26 | End: 2019-10-26

## 2019-10-26 RX ORDER — ACETAMINOPHEN 325 MG/1
650 TABLET ORAL EVERY 4 HOURS PRN
Status: DISCONTINUED | OUTPATIENT
Start: 2019-10-26 | End: 2019-10-27 | Stop reason: HOSPADM

## 2019-10-26 RX ORDER — ONDANSETRON 2 MG/ML
4 INJECTION INTRAMUSCULAR; INTRAVENOUS EVERY 6 HOURS PRN
Status: DISCONTINUED | OUTPATIENT
Start: 2019-10-26 | End: 2019-10-26

## 2019-10-26 RX ORDER — PRENATAL VIT/IRON FUM/FOLIC AC 27MG-0.8MG
1 TABLET ORAL DAILY
Status: DISCONTINUED | OUTPATIENT
Start: 2019-10-26 | End: 2019-10-27 | Stop reason: HOSPADM

## 2019-10-26 RX ORDER — IBUPROFEN 800 MG/1
800 TABLET, FILM COATED ORAL EVERY 6 HOURS PRN
Status: DISCONTINUED | OUTPATIENT
Start: 2019-10-26 | End: 2019-10-27 | Stop reason: HOSPADM

## 2019-10-26 RX ORDER — AMOXICILLIN 250 MG
2 CAPSULE ORAL 2 TIMES DAILY
Status: DISCONTINUED | OUTPATIENT
Start: 2019-10-26 | End: 2019-10-27 | Stop reason: HOSPADM

## 2019-10-26 RX ORDER — OXYTOCIN 10 [USP'U]/ML
10 INJECTION, SOLUTION INTRAMUSCULAR; INTRAVENOUS
Status: DISCONTINUED | OUTPATIENT
Start: 2019-10-26 | End: 2019-10-27 | Stop reason: HOSPADM

## 2019-10-26 RX ORDER — OXYCODONE AND ACETAMINOPHEN 5; 325 MG/1; MG/1
1 TABLET ORAL
Status: DISCONTINUED | OUTPATIENT
Start: 2019-10-26 | End: 2019-10-26

## 2019-10-26 RX ORDER — OXYTOCIN/0.9 % SODIUM CHLORIDE 30/500 ML
340 PLASTIC BAG, INJECTION (ML) INTRAVENOUS CONTINUOUS PRN
Status: DISCONTINUED | OUTPATIENT
Start: 2019-10-26 | End: 2019-10-27 | Stop reason: HOSPADM

## 2019-10-26 RX ORDER — SODIUM CHLORIDE, SODIUM LACTATE, POTASSIUM CHLORIDE, CALCIUM CHLORIDE 600; 310; 30; 20 MG/100ML; MG/100ML; MG/100ML; MG/100ML
INJECTION, SOLUTION INTRAVENOUS CONTINUOUS
Status: DISCONTINUED | OUTPATIENT
Start: 2019-10-26 | End: 2019-10-26

## 2019-10-26 RX ORDER — CARBOPROST TROMETHAMINE 250 UG/ML
250 INJECTION, SOLUTION INTRAMUSCULAR
Status: DISCONTINUED | OUTPATIENT
Start: 2019-10-26 | End: 2019-10-26

## 2019-10-26 RX ORDER — OXYTOCIN/0.9 % SODIUM CHLORIDE 30/500 ML
100 PLASTIC BAG, INJECTION (ML) INTRAVENOUS CONTINUOUS
Status: DISCONTINUED | OUTPATIENT
Start: 2019-10-26 | End: 2019-10-27 | Stop reason: HOSPADM

## 2019-10-26 RX ORDER — METHYLERGONOVINE MALEATE 0.2 MG/ML
200 INJECTION INTRAVENOUS
Status: DISCONTINUED | OUTPATIENT
Start: 2019-10-26 | End: 2019-10-26

## 2019-10-26 RX ORDER — OXYTOCIN/0.9 % SODIUM CHLORIDE 30/500 ML
100-340 PLASTIC BAG, INJECTION (ML) INTRAVENOUS CONTINUOUS PRN
Status: DISCONTINUED | OUTPATIENT
Start: 2019-10-26 | End: 2019-10-26

## 2019-10-26 RX ADMIN — IBUPROFEN 800 MG: 800 TABLET ORAL at 23:37

## 2019-10-26 RX ADMIN — SENNOSIDES AND DOCUSATE SODIUM 1 TABLET: 8.6; 5 TABLET ORAL at 09:02

## 2019-10-26 RX ADMIN — ACETAMINOPHEN 650 MG: 325 TABLET, FILM COATED ORAL at 17:42

## 2019-10-26 RX ADMIN — ACETAMINOPHEN 650 MG: 325 TABLET, FILM COATED ORAL at 12:20

## 2019-10-26 RX ADMIN — PRENATAL VIT W/ FE FUMARATE-FA TAB 27-0.8 MG 1 TABLET: 27-0.8 TAB at 09:02

## 2019-10-26 RX ADMIN — ACETAMINOPHEN 650 MG: 325 TABLET, FILM COATED ORAL at 21:41

## 2019-10-26 RX ADMIN — OXYTOCIN 10 UNITS: 10 INJECTION INTRAVENOUS at 06:06

## 2019-10-26 RX ADMIN — SENNOSIDES AND DOCUSATE SODIUM 1 TABLET: 8.6; 5 TABLET ORAL at 21:41

## 2019-10-26 RX ADMIN — IBUPROFEN 800 MG: 800 TABLET ORAL at 12:20

## 2019-10-26 RX ADMIN — IBUPROFEN 800 MG: 800 TABLET ORAL at 17:57

## 2019-10-26 RX ADMIN — ACETAMINOPHEN 650 MG: 325 TABLET, FILM COATED ORAL at 07:57

## 2019-10-26 RX ADMIN — IBUPROFEN 800 MG: 800 TABLET ORAL at 06:28

## 2019-10-26 ASSESSMENT — ACTIVITIES OF DAILY LIVING (ADL)
RETIRED_EATING: 0-->INDEPENDENT
TOILETING: 0-->INDEPENDENT
COGNITION: 0 - NO COGNITION ISSUES REPORTED
AMBULATION: 0-->INDEPENDENT
SWALLOWING: 0-->SWALLOWS FOODS/LIQUIDS WITHOUT DIFFICULTY
DRESS: 0-->INDEPENDENT
FALL_HISTORY_WITHIN_LAST_SIX_MONTHS: NO
BATHING: 0-->INDEPENDENT
TRANSFERRING: 0-->INDEPENDENT
RETIRED_COMMUNICATION: 0-->UNDERSTANDS/COMMUNICATES WITHOUT DIFFICULTY

## 2019-10-26 NOTE — PROVIDER NOTIFICATION
10/26/19 0410   Provider Notification   Provider Name/Title Lizette Crews CNM   Method of Notification At Bedside   Request Evaluate in Person     CNM met patient upon arrival. SROM at 0130. Patient arrived dillon regularly and thought she was going to deliver in the car.      40.3 here for labor. Uncomplicated pregnancy. GBS-. CNM will place orders and stay for delivery.

## 2019-10-26 NOTE — TELEPHONE ENCOUNTER
Patient calling back. Stating she is feeling contractions about every 3-5 minutes now and she is needing to breathe through them. Instructed patient to present to the hospital. L&D updated on impending arrival.    Lizette Crews CNM on 10/26/2019 at 3:35 AM

## 2019-10-26 NOTE — PLAN OF CARE
Data: Philly Busch transferred to [facility] via wheelchair at 0930. Baby transferred via parent's arms.  Action: Receiving unit notified of transfer: Yes. Patient and family notified of room change. Report given to Jenni at 0930. Belongings sent to receiving unit. Accompanied by Registered Nurse. Oriented patient to surroundings. Call light within reach. ID bands double-checked with receiving RN.  Response: Patient tolerated transfer and is stable.    Patients mobililty level scored using the bedside mobility assistance tool (BMAT). Patient is at a mobility level test number: 4. Mobility equipment used: none required. Required assist of 0 staff members. Further use of BMAT scoring not required.

## 2019-10-26 NOTE — TELEPHONE ENCOUNTER
Received page from patient reporting waking up to a possible contraction and a large gush of fluid around 0130. States the fluid is continuing to leak and was initially pink-tinged, but now clear. +FM. GBS neg. NO CONSISTENT CONTRACTIONS. Reviewed options of watchful waiting at home or presenting to L&D for evaluation. Patient prefers to remain at home for now. Reviewed reasons to present to the hospital sooner than 12 hours s/p ROM, including decreased FM, yellow/green-tinged fluid, heavy bleeding, or active labor. Patient verbalizes understanding and will call back with any changes. Minimally, she will plan to call on-call pager by 1pm to discuss plan of care.    Lizette Crews CNM on 10/26/2019 at 2:04 AM

## 2019-10-26 NOTE — H&P
SILKE Labor Admission History & Physical    Philly Busch is a 38 year old  with an IUP at 40w3d  ; ,   Partner/support Person: Joce  Language Barrier: English  Clinic: [facility]  Provider: Alley Madrid CNM    Philly Busch is admitted to the Birthplace at [facility] on 10/26/2019 at 4:14 AM       History of present inllness/Chief Complaint:  Spontaneous rupture of membranes around 0130 which progressed into active labor.  Here with: spontaneous onset of labor  Patient reports contractions are Regular           Baby active Yes  Membranes are grossly ruptured since 0130.  Bloody show Yes   Any changes with medical history since last prenatal visit No  Declines syphilis screening.  no    Obstetrical history  Estimated Date of Delivery: Oct 23, 2019 determined by early US and LMP  Patient's last menstrual period was 2019 (exact date).   Dating U/S: 3/22/19    Fetal anatomic survey: Normal  Placenta: posterior and possibly one placental lake near cord insertion    PRENATAL COURSE  Prenatal care began at 8 wks gestation for a total of 14 prenatal visits.  Total wt gain 33; There is no height or weight on file to calculate BMI.  Prenatal Blood Pressure: WNL  Prenatal course was complicated by AMA and possible placental lakes  Tdap: given   Rhogam: n/a    Patient Active Problem List   Diagnosis     Endometriosis     Melanoma in situ of left breast (H)     History of placenta previa     Supervision of other normal pregnancy, antepartum     Low-lying placenta in second trimester     Indication for care in labor and delivery, antepartum       HISTORY  Allergies   Allergen Reactions     Lactose      Intolerance     No Known Drug Allergies      Past Medical History:   Diagnosis Date     Cancer (H)     stage 0 melanoma chest     Heart disease      incorrect dx ofLong QT wave, defribrillator placed. Dx reversed in  no longer has defibrillator, but wires still in place     Past Surgical  History:   Procedure Laterality Date     CL AFF SURGICAL PATHOLOGY      melanoma chest stage 0     defribrillator[       LAPAROSCOPY DIAGNOSTIC (GYN)      endometriosis     Family History   Problem Relation Age of Onset     Family History Negative Mother      Family History Negative Father      ALS Maternal Grandmother      Leukemia Paternal Grandfather      Social History     Tobacco Use     Smoking status: Never Smoker     Smokeless tobacco: Never Used   Substance Use Topics     Alcohol use: No     OB History    Para Term  AB Living   3 2 2 0 0 2   SAB TAB Ectopic Multiple Live Births   0 0 0 0 2      # Outcome Date GA Lbr Uziel/2nd Weight Sex Delivery Anes PTL Lv   3 Current            2 Term 14 39w3d / 00:15 3.37 kg (7 lb 6.9 oz) F Vag-Spont Local N MARTHA      Apgar1: 8  Apgar5: 9   1 Term 12 39w1d 08:06 / 00:39 3.459 kg (7 lb 10 oz) M Vag-Spont Local N MARTHA      Name: GARTH TURNER      Apgar1: 8  Apgar5: 9       LABS:  Lab Results   Component Value Date    ABO O 10/21/2019    RH Pos 10/21/2019    AS Neg 10/21/2019    HGB 12.8 10/21/2019    HEPBANG Nonreactive 2019    CHPCRT Negative 2019    GCPCRT Negative 2019    TREPAB negative 09/15/2011    RUBELLAABIGG immune 09/15/2011    HIV negative 09/15/2011       GBS Status:   Lab Results   Component Value Date    GBS Negative 10/01/2019     Rubella: Immune    HIV: Non-Reactive   Platelets:  WNL  1hr GCT:  pass    ROS   Pt is alert and oriented  Pt denies significant constitutional symptoms including fever and/or malaise.    Pt denies significant respiratory, cardiovacular, GI, or muscular/skeletal complaints.    Neuro: Denies HA and visual changes  Muscoloskeletal: Denies except for discomforts r/t pregnancy     PHYSICAL EXAM:  LMP 2019 (Exact Date)   General appearance:  healthy, alert and active   Heart: RRR  Lungs: CTA bilaterally, normal respiratory effort  Abdomen: gravid, single vertex fetus, non-tender, EFW  7 lbs.   Legs: reflexes 2+ bilaterally, no clonus, no edema     Contractions: Pt is dillon every 2-3 minutes, lasting 60-80 seconds and palpates moderate    Fetal heart tones: Baseline 145   Variability: moderate   Accelerations: absent  Decelerations: absent    NST: non-reactive    Cervix: 8/ 100% / Anterior/ soft/ 0, Vtx  Bloody show: yes   Membranes:  ruptured    ASSESSMENT:  38 year old  with anthony IUP 40w3d in active labor  NST not enough time on monitor  GBS negative and membranes ruptured for 3 hours    PLAN:  Routine CNM care  Labs ordered: Hemoglobin and type and screen  Teaching done r/t comfort measures, pain management options, and labor processes,   Admit - see IP orders  Anticipate     CHARLES EspinosaM

## 2019-10-26 NOTE — PROGRESS NOTES
Dominick Lucero CNM Progress Note: Postpartum Day #0    2019  2:26 PM    SUBJECTIVE:  Patient is stable  and is tolerating acitivity well  Baby is rooming in  Complications since 2 hours post delivery: None  Pain is well controlled.  Patient is taking pain medications.  Breastfeeding status:initiated   Elimination:  She is voiding without difficulty.  She has not had a bowel movement  Denies heavy bleeding and passing large clots.  Feels great about birth experience.    OBJECTIVE:  /58   Temp 97.7  F (36.5  C) (Oral)   Resp 16   LMP 2019 (Exact Date)   Breastfeeding? Unknown     Constitutional: healthy, alert and no distress    Breasts: Currently breastfeeding    Lochia: Lochia is appropriate for the duration of time since delivery.     ASSESSMENT:  PPD #0    Doing well.  Hemoglobin   Date Value Ref Range Status   10/21/2019 12.8 11.7 - 15.7 g/dL Final   ]      PLAN:  Continue routine care  Breast feeding strategies discussed  Reviewed breastfeeding  Anticipated discharge considering early discharge tomorrow, still unsure        Chrissy Lepe CNM

## 2019-10-26 NOTE — PLAN OF CARE
Patient meeting expected goals this shift. Able to do all self cares and cares for . Voiding without difficulty. Education taught to patient and patient verbalized understanding.

## 2019-10-26 NOTE — L&D DELIVERY NOTE
OB Vaginal Delivery Note    Philly Busch MRN# [id number]   Age: 38 year old YOB: 1980       GA: 40w3d  GP:   Labor Complications: None   EBL:    mL  Delivery QBL: 107 mL  Delivery Type: Vaginal, Spontaneous   ROM to Delivery Time: (Delivered) Hours: 4 Minutes: 34  [place] Weight:      1 Minute 5 Minute 10 Minute   Apgar Totals: 9    9          MANUEL ABBOTT;ANGIE DECKER;SUNDAR ROMERO     Delivery Details:  Philly Busch, a 38 year old  female delivered a viable infant with apgars of 9   and 9  . Patient was fully dilated and pushing after   3 hours    minutes in active labor. Delivery was via vaginal, spontaneous  to a sterile field under none  anesthesia. Infant delivered in vertex  right  occiput  anterior  position. Anterior and posterior shoulders delivered without difficulty. The cord was clamped, cut twice and 3 vessels  were noted. Cord blood was obtained in routine fashion with the following disposition: lab .  IM pitocin given after delivery of baby. Continuous labor support provided by CNM and RN.    Cord complications: none   Placenta delivered at 10/26/2019  6:08 AM . Placental disposition was Hospital disposal . Fundal massage performed and fundus found to be firm.     Episiotomy: none    Perineum, vagina, cervix were inspected, and the following lacerations were noted:   Perineal lacerations: 1st , well approximated and hemostatic, thus not repaired.                      Excellent hemostasis was noted. Needle count correct. Infant and patient in delivery room in good and stable condition.        Labor Event Times    Labor onset date:  10/21/19 Onset time:   5:05 PM   Dilation complete date:  10/26/19 Complete time:   5:49 AM   Start pushing date/time:  10/26/2019 0549      Labor Events     labor?:  No   steroids:  None  Labor Type:  Spontaneous     Antibiotics received during labor?:  No     Rupture date/time: 10/26/19 0130   Rupture  type:  Spontaneous rupture of membranes occuring during spontaneous labor or augmentation  Fluid color:  Clear  Fluid odor:  Normal     1:1 continuous labor support provided by?:  RN       Delivery/Placenta Date and Time    Delivery Date:  10/26/19 Delivery Time:   6:04 AM   Placenta Date/Time:  10/26/2019  6:08 AM  Oxytocin given at the time of delivery:  after delivery of baby     Vaginal Counts     Initial count performed by 2 team members:   Two Team Members   Lizette Decker RN       [place] [employer] [place] [employer]   Initial counts 2  5    Added to count       Final counts 2  5    Placed during labor Accounted for at the end of labor   No NA   No NA   No NA    Final count performed by 2 team members:   Two Team Members   Lizette Rodriguez RN      Final count correct?:  Yes         Apgars    Living status:  Living   1 Minute 5 Minute 10 Minute 15 Minute 20 Minute   Skin color: 1  1       Heart rate: 2  2       Reflex irritability: 2  2       Muscle tone: 2  2       Respiratory effort: 2  2       Total: 9  9       Apgars assigned by:  ANGIE DECKER RN     Cord    Vessels:  3 Vessels Complications:  None   Cord Blood Disposition:  Lab Gases Sent?:  No      [place] Resuscitation    Methods:  None          Skin to Skin and Feeding Plan    Skin to skin initiation date/time: 1/10/1841    Skin to skin with:  Mother  Skin to skin end date/time:     How do you plan to feed your baby:  Breastfeeding     Labor Events and Shoulder Dystocia    Fetal Tracing Comments:  intermittent monitoring, no decreases auscultated  Shoulder dystocia present?:  Neg             Delivery (Maternal) (Provider to Complete) ([id number])    Episiotomy:  None  Perineal lacerations:  1st Repaired?:  No      Blood Loss  Mother: Philly Busch #[id number]   Start of Mother's Information    IO Blood Loss  10/21/19 1705 - 10/26/19 0623    Delivery QBL (mL) Hospital Encounter 107 mL    Total  107 mL          End of Mother's Information  Mother: Philly Busch #[id number]         Delivery - Provider to Complete ([id number])    Delivering clinician:  Lizette Crews CNM CNM Care:  Exclusive CNM care in labor  Attempted Delivery Types (Choose all that apply):  Spontaneous Vaginal Delivery  Delivery Type (Choose the 1 that will go to the Birth History):  Vaginal, Spontaneous   Other personnel:   Provider Role   Lizette Crews CNM Certified Nurse Midwife   Peri Benavides, RN Delivery Nurse   Eula Rodriguez RN Delivery Assist         Placenta    Delayed Cord Clamping:  Done  Date/Time:  10/26/2019  6:08 AM  Removal:  Spontaneous  Comments:  intact, 3vc, [place]  Disposition:  Hospital disposal     Anesthesia    Method:  None          Presentation and Position    Presentation:  Vertex  Position:  Right Occiput Anterior           Lizette Crews CNM

## 2019-10-27 VITALS
DIASTOLIC BLOOD PRESSURE: 67 MMHG | HEART RATE: 64 BPM | SYSTOLIC BLOOD PRESSURE: 107 MMHG | RESPIRATION RATE: 16 BRPM | TEMPERATURE: 97.4 F

## 2019-10-27 LAB — HGB BLD-MCNC: 11.8 G/DL (ref 11.7–15.7)

## 2019-10-27 PROCEDURE — 36415 COLL VENOUS BLD VENIPUNCTURE: CPT | Performed by: ADVANCED PRACTICE MIDWIFE

## 2019-10-27 PROCEDURE — 85018 HEMOGLOBIN: CPT | Performed by: ADVANCED PRACTICE MIDWIFE

## 2019-10-27 PROCEDURE — 25000132 ZZH RX MED GY IP 250 OP 250 PS 637: Performed by: ADVANCED PRACTICE MIDWIFE

## 2019-10-27 RX ADMIN — SENNOSIDES AND DOCUSATE SODIUM 1 TABLET: 8.6; 5 TABLET ORAL at 08:08

## 2019-10-27 RX ADMIN — PRENATAL VIT W/ FE FUMARATE-FA TAB 27-0.8 MG 1 TABLET: 27-0.8 TAB at 08:08

## 2019-10-27 RX ADMIN — IBUPROFEN 800 MG: 800 TABLET ORAL at 06:37

## 2019-10-27 NOTE — LACTATION NOTE
Lactation in to see patient. Observed a feed. Baby latched well with some swallows. Encouraged breast compressions. All questions answered answered at this time. Basic breastfeeding information reviewed. Knows to call for assistance prn.

## 2019-10-27 NOTE — DISCHARGE SUMMARY
[facility]    Discharge Summary  Obstetrics    Date of Admission:  10/26/2019  Date of Discharge:  10/27/2019  Discharging Provider: Lizette Crews  Date of Service (when I saw the patient): 10/27/19    Discharge Diagnoses       History of Present Illness   Philly Busch is a 39 year old female who presented with spontaneous labor resulting in a spontaneous vaginal delivery of a viable male infant.    Hospital Course   The patient's hospital course was unremarkable.  She recovered as anticipated and experienced no post-delivery complications.  On discharge, her pain was well controlled. Vaginal bleeding is similar to peak menstrual flow.  Voiding without difficulty.  Ambulating well and tolerating a normal diet.  No fevers.  Breastfeeding well.  Infant is stable.  She was discharged on post-partum day #1.    Post-partum hemoglobin:   Hemoglobin   Date Value Ref Range Status   10/27/2019 11.8 11.7 - 15.7 g/dL Final       Lizette Crews, CNM    Discharge Disposition   Discharged to home   Condition at discharge: Stable    Primary Care Physician   Nicole Capellan    Consultations This Hospital Stay   ANESTHESIOLOGY IP CONSULT  HOME CARE POST PARTUM/ IP CONSULT  LACTATION IP CONSULT    Discharge Orders      Activity    Review discharge instructions     Reason for your hospital stay    Maternity care     Follow Up and recommended labs and tests    None     Discharge Instructions - Postpartum visit    Schedule postpartum visit with your provider and return to clinic in 6 weeks.     Diet    Resume previous diet     Discharge Medications   Current Discharge Medication List      CONTINUE these medications which have NOT CHANGED    Details   Prenatal Vit-Fe Fumarate-FA (PRENATAL MULTIVITAMIN  PLUS IRON) 27-0.8 MG TABS Take 1 tablet by mouth daily.      Misc. Devices (BREAST PUMP) MISC 1 each as needed (As needed)  Qty: 1 each, Refills: 0    Associated Diagnoses: Encounter for supervision of other  normal pregnancy, third trimester           Allergies   Allergies   Allergen Reactions     No Known Drug Allergies        Lizette Crews CNM on 10/27/2019 at 10:28 AM

## 2019-10-27 NOTE — DISCHARGE INSTRUCTIONS
Postpartum Vaginal Delivery Instructions    Follow up in 6 weeks  [facility] [phone]    Activity       Ask family and friends for help when you need it.    Do not place anything in your vagina for 6 weeks.    You are not restricted on other activities, but take it easy for a few weeks to allow your body to recover from delivery.  You are able to do any activities you feel up to that point.    No driving until you have stopped taking your pain medications (usually two weeks after delivery).     Call your health care provider if you have any of these symptoms:       Increased pain, swelling, redness, or fluid around your stiches from an episiotomy or perineal tear.    A fever above 100.4 F (38 C) with or without chills when placing a thermometer under your tongue.    You soak a sanitary pad with blood within 1 hour, or you see blood clots larger than a golf ball.    Bleeding that lasts more than 6 weeks.    Vaginal discharge that smells bad.    Severe pain, cramping or tenderness in your lower belly area.    A need to urinate more frequently (use the toilet more often), more urgently (use the toilet very quickly), or it burns when you urinate.    Nausea and vomiting.    Redness, swelling or pain around a vein in your leg.    Problems breastfeeding or a red or painful area on your breast.    Chest pain and cough or are gasping for air.    Problems coping with sadness, anxiety, or depression.  If you have any concerns about hurting yourself or the baby, call your provider immediately.     You have questions or concerns after you return home.     Keep your hands clean:  Always wash your hands before touching your perineal area and stitches.  This helps reduce your risk of infection.  If your hands aren't dirty, you may use an alcohol hand-rub to clean your hands. Keep your nails clean and short.

## 2019-10-27 NOTE — PLAN OF CARE
VSS. Up ad emilee. Voiding without difficulty. Pain managed with tylenol and ibuprofen. Breastfeeding infant with no assistance from staff needed during shift. Bonding appropriately with infant and attentive to cares.  supportive at bedside. Patient would like an early discharge today.

## 2019-10-27 NOTE — PROGRESS NOTES
Data: Vital signs within normal limits. Postpartum checks within normal limits - see flow record. Patient eating and drinking normally. Patient able to empty bladder independently and is up ambulating. No apparent signs of infection.  Patient performing self cares and is able to care for infant.  Action: Patient medicated during the shift for pain with ibuprofen See MAR. Patient reassessed within 1 hour after each medication and pain was improved - patient stated she was comfortable. Patient education complete. See flow record.  Response: Positive attachment behaviors observed with infant. Support persons  present.   Plan: Anticipate discharge today at 1155. AVS read to patient and all questions and concerns addressed. Patient to follow up with clinic in 6 weeks.

## 2019-11-21 ENCOUNTER — TELEPHONE (OUTPATIENT)
Dept: OBGYN | Facility: CLINIC | Age: 39
End: 2019-11-21

## 2019-11-21 PROBLEM — O44.42 LOW-LYING PLACENTA IN SECOND TRIMESTER: Status: RESOLVED | Noted: 2019-07-23 | Resolved: 2019-11-21

## 2019-11-21 PROBLEM — Z34.80 SUPERVISION OF OTHER NORMAL PREGNANCY, ANTEPARTUM: Status: RESOLVED | Noted: 2019-03-25 | Resolved: 2019-11-21

## 2019-11-21 PROBLEM — Z87.59 HISTORY OF PLACENTA PREVIA: Status: RESOLVED | Noted: 2019-03-25 | Resolved: 2019-11-21

## 2019-11-21 NOTE — TELEPHONE ENCOUNTER
Called patient and discussed her symptoms. Reviewed possible reasons for bleeding s/p resolution of lochia after birth, including delayed PPH, retained products, and return of menses. Based on patient's description of bleeding, and review of her delivery, my suspicion for retained products is low and this is likely menses. Reviewed current bleeding pattern and warning s/s of hemorrhage, including saturating a pad in 1 hour x2, passing multiple clots, SOB, dizziness, or light-headedness. Patient states she feels well currently and is now changing a pad every 3 hours. Has not continued to pass clots. Instructed patient to present to the ED if bleeding worsens or becomes symptomatic. Patient verbalizes understanding of plan and will continue to monitor.    Lizette Crews CNM on 11/21/2019 at 8:57 AM

## 2019-12-09 ENCOUNTER — PRENATAL OFFICE VISIT (OUTPATIENT)
Dept: OBGYN | Facility: CLINIC | Age: 39
End: 2019-12-09
Payer: COMMERCIAL

## 2019-12-09 VITALS
BODY MASS INDEX: 21.5 KG/M2 | HEIGHT: 67 IN | DIASTOLIC BLOOD PRESSURE: 68 MMHG | SYSTOLIC BLOOD PRESSURE: 100 MMHG | WEIGHT: 137 LBS

## 2019-12-09 PROCEDURE — 99207 ZZC POST PARTUM EXAM: CPT | Performed by: ADVANCED PRACTICE MIDWIFE

## 2019-12-09 ASSESSMENT — MIFFLIN-ST. JEOR: SCORE: 1321.12

## 2019-12-09 NOTE — PROGRESS NOTES
"Midwife Postpartum 6 Week Visit    Philly Busch is a 39 year old here for a postpartum checkup. She is concerned about a \"bulge\" that she has noted around her umbilicus     Delivery date was 10/26/2019. She had a  of a viable boy, weight 8 pounds 0 oz., with none complications      Since delivery, she has been breast feeding.  She has not had any signs of infection, her lochia stopped after 4 weeks.  She has not had other complications.      She is voiding and having bowel movements without difficulty.       Contraception was discussed and patient desires vasectomy is planned.   She  has not had intercourse since delivery.   She complains of No  perineal discomfort.     Mood is Stable  Patient screened for postpartum depression.   Depression Rating was:   Last PHQ-9 score on record = No flowsheet data found.  Last GAD7 score on record = No flowsheet data found.      ROS:  CONSTITUTIONAL: NEGATIVE for fever, chills, change in weight  INTEGUMENTARU/SKIN: NEGATIVE for worrisome rashes, moles or lesions  EYES: NEGATIVE for vision changes or irritation  ENT: NEGATIVE for ear, mouth and throat problems  RESP: NEGATIVE for significant cough or SOB  BREAST: NEGATIVE for masses, tenderness or discharge  CV: NEGATIVE for chest pain, palpitations or peripheral edema  GI: NEGATIVE for nausea, abdominal pain, heartburn, or change in bowel habits  : NEGATIVE for unusual urinary or vaginal symptoms. Periods have not yet resumed  MUSCULOSKELETAL: NEGATIVE for significant arthralgias or myalgia  NEURO: NEGATIVE for weakness, dizziness or paresthesias  ENDOCRINE: NEGATIVE for temperature intolerance, skin/hair changes  HEME/ALLERGY/IMMUNE: NEGATIVE for bleeding problems  PSYCHIATRIC: NEGATIVE for changes in mood or affect      Current Outpatient Medications:      Misc. Devices (BREAST PUMP) MISC, 1 each as needed (As needed), Disp: 1 each, Rfl: 0     Prenatal Vit-Fe Fumarate-FA (PRENATAL MULTIVITAMIN  PLUS IRON) 27-0.8 MG " "TABS, Take 1 tablet by mouth daily., Disp: , Rfl: .   OB History    Para Term  AB Living   3 3 3 0 0 3   SAB TAB Ectopic Multiple Live Births   0 0 0 0 3      # Outcome Date GA Lbr Uziel/2nd Weight Sex Delivery Anes PTL Lv   3 Term 10/26/19 40w3d 108:44 / 00:15 3.63 kg (8 lb) M Vag-Spont None N MARTHA      Name: JERICHO TURNER-FABIANA      Apgar1: 9  Apgar5: 9   2 Term 14 39w3d / 00:15 3.37 kg (7 lb 6.9 oz) F Vag-Spont Local N MARTHA      Apgar1: 8  Apgar5: 9   1 Term 12 39w1d 08:06 / 00:39 3.459 kg (7 lb 10 oz) M Vag-Spont Local N MARTHA      Name: GARTH TURNER      Apgar1: 8  Apgar5: 9     Last pap:  No results found for: PAP      EXAM:  /68 (BP Location: Left arm, Cuff Size: Adult Regular)   Ht 1.689 m (5' 6.5\")   Wt 62.1 kg (137 lb)   LMP 2019 (Exact Date)   BMI 21.78 kg/m    BMI: Body mass index is 21.78 kg/m .  Exam:  Constitutional: healthy, alert and no distress  Head: Normocephalic. No masses, lesions, tenderness or abnormalities  Neck: Neck supple. No adenopathy. Thyroid symmetric, normal size,, Carotids without bruits.  ENT: ENT exam normal, no neck nodes or sinus tenderness  Cardiovascular: negative, PMI normal. No lifts, heaves, or thrills. RRR. No murmurs, clicks gallops or rub  Respiratory: negative, Percussion normal. Good diaphragmatic excursion. Lungs clear  Breasts: normal without suspicious masses, skin changes or axillary nodes, symmetric fibrous changes in both upper outer quadrants, self exam is taught and encouraged. Deferred as patient is lactating  Gastrointestinal: Abdomen soft, non-tender. BS normal. No masses, organomegaly, diastasis recti 2 cm, questionable small umbilical hernia    Musculoskeletal: extremities normal- no gross deformities noted, gait normal and normal muscle tone  Skin: no suspicious lesions or rashes  Neurologic: Gait normal. Reflexes normal and symmetric. Sensation grossly WNL.  Psychiatric: mentation appears normal and affect " normal/bright  Hematologic/Lymphatic/Immunologic: Normal cervical lymph nodes  PELVIC EXAM:  Vulva: No lesions, well healed, BUS WNL, no tenderness  Vagina: Moist, pink, discharge normal  well rugated, no lesions  Cervix:smooth, no  lesions  Uterus: Involuted to normal size, non-tender, no masses palpated  Ovaries: No masses palpated  Pelvic tone: WNL  Rectal exam: deferred      ASSESSMENT:   Normal postpartum exam after .    ICD-10-CM    1. Routine postpartum follow-up Z39.2          PLAN:  No results found for any visits on 19.    Return as needed or at time of next expected pap, pelvic, or breast exam.  Teaching: weight/diet  Family Planning:vasectomy  Encourage Kegels and abdominal exercise.  Continue a multivitamin/prenatal supplement, especially if breastfeeding.  Pap smear was not obtained today.  Postpartum Hgb was not done today.    GDM:  Fasting and 2hr GCT needed:  No  If yes, remind need for annual fasting BG and nutrition/exercise recommendations.    JAIMIE Hurtado, CNM

## 2019-12-09 NOTE — NURSING NOTE
"Chief Complaint   Patient presents with     Postpartum Care     Delivered on 10/26/2019, , male 8 lbs 0 oz. Check abdominal buldge, has had since prior to pregnancy, causes some pain.  plans to get vasectomy. Denies infection or other concerns       Initial /68 (BP Location: Left arm, Cuff Size: Adult Regular)   Ht 1.689 m (5' 6.5\")   Wt 62.1 kg (137 lb)   LMP 2019 (Exact Date)   BMI 21.78 kg/m   Estimated body mass index is 21.78 kg/m  as calculated from the following:    Height as of this encounter: 1.689 m (5' 6.5\").    Weight as of this encounter: 62.1 kg (137 lb).  BP completed using cuff size: regular    Questioned patient about current smoking habits.  Pt. has never smoked.          The following HM Due: NONE    Jonathan Miguel CMA    "

## 2021-05-29 NOTE — PROGRESS NOTES
"Please see \"Imaging\" tab under Chart Review for full report.  This ultrasound was performed in the [facility], and may be located under Care Everywhere.    Lisa Yeung MD  Maternal Fetal Medicine    "